# Patient Record
Sex: MALE | Race: WHITE | NOT HISPANIC OR LATINO | Employment: FULL TIME | URBAN - METROPOLITAN AREA
[De-identification: names, ages, dates, MRNs, and addresses within clinical notes are randomized per-mention and may not be internally consistent; named-entity substitution may affect disease eponyms.]

---

## 2020-08-25 ENCOUNTER — APPOINTMENT (OUTPATIENT)
Dept: PREADMISSION TESTING | Facility: HOSPITAL | Age: 60
End: 2020-08-25

## 2020-08-25 LAB
REF LAB TEST METHOD: NORMAL
SARS-COV-2 RNA RESP QL NAA+PROBE: NOT DETECTED

## 2020-08-25 PROCEDURE — C9803 HOPD COVID-19 SPEC COLLECT: HCPCS

## 2020-08-25 PROCEDURE — U0004 COV-19 TEST NON-CDC HGH THRU: HCPCS

## 2020-08-25 PROCEDURE — U0002 COVID-19 LAB TEST NON-CDC: HCPCS

## 2020-10-12 ENCOUNTER — OFFICE VISIT (OUTPATIENT)
Dept: NEUROLOGY | Facility: CLINIC | Age: 60
End: 2020-10-12

## 2020-10-12 VITALS
HEART RATE: 99 BPM | SYSTOLIC BLOOD PRESSURE: 128 MMHG | HEIGHT: 76 IN | TEMPERATURE: 97.3 F | BODY MASS INDEX: 28.69 KG/M2 | DIASTOLIC BLOOD PRESSURE: 86 MMHG | OXYGEN SATURATION: 98 % | WEIGHT: 235.6 LBS

## 2020-10-12 DIAGNOSIS — R25.1 TREMOR: Primary | ICD-10-CM

## 2020-10-12 PROCEDURE — 99204 OFFICE O/P NEW MOD 45 MIN: CPT | Performed by: PSYCHIATRY & NEUROLOGY

## 2020-10-12 RX ORDER — PRIMIDONE 50 MG/1
100 TABLET ORAL NIGHTLY
Qty: 60 TABLET | Refills: 2 | Status: CANCELLED | OUTPATIENT
Start: 2020-10-12 | End: 2021-10-12

## 2020-10-12 RX ORDER — ERGOCALCIFEROL (VITAMIN D2) 10 MCG
400 TABLET ORAL DAILY
COMMUNITY
End: 2022-12-22

## 2020-10-12 RX ORDER — UBIDECARENONE 75 MG
CAPSULE ORAL
COMMUNITY
End: 2022-12-22

## 2020-10-12 NOTE — PROGRESS NOTES
Subjective:    CC: Benjie Wheat is seen today in consultation at the request of Cruzito Muñoz MD for Tremors       HPI:  Patient is a 59-year-old male without any past medical history referred to the clinic for evaluation of tremors.  He reports that he started having tremors about 14 months ago in August 2019.  It primarily involves left hand and their worst when he is writing.  He is an  by profession and he has to write a lot throughout the day and it has progressively become worse.  He reports that in addition to writing, he has also noticed tremors while he is holding cup of coffee, glass of water or when he is using spoon or fork.  He denies any resting tremors.  Denies any problems with walking, no problems with chewing swallowing and denies any change in speech quality last 40 years.  There is no family history of tremors.    The following portions of the patient's history were reviewed today and updated as of 10/12/2020  : allergies, social history and problem list.  This document will be scanned to patient's chart.      Current Outpatient Medications:   •  vitamin B-12 (CYANOCOBALAMIN) 100 MCG tablet, cyanocobalamin (vit B-12) 1,000 mcg tablet  TK 1 T PO QD, Disp: , Rfl:   •  Vitamin D, Cholecalciferol, (CHOLECALCIFEROL) 10 MCG (400 UNIT) tablet, Take 400 Units by mouth Daily., Disp: , Rfl:    Past Medical History:   Diagnosis Date   • Fracture    • Kidney stones       Past Surgical History:   Procedure Laterality Date   • CHOLECYSTECTOMY        History reviewed. No pertinent family history.   Review of Systems   Constitutional: Negative.    HENT: Negative.    Eyes: Negative.    Respiratory: Negative.    Cardiovascular: Negative.    Gastrointestinal: Negative.    Endocrine: Negative.    Genitourinary: Negative.    Musculoskeletal: Negative.    Skin: Negative.    Allergic/Immunologic: Negative.    Neurological: Positive for tremors.   Hematological: Negative.    Psychiatric/Behavioral:  "Negative.        All other systems reviewed and are negative     Objective:    /86   Pulse 99   Temp 97.3 °F (36.3 °C)   Ht 193 cm (76\")   Wt 107 kg (235 lb 9.6 oz)   SpO2 98%   BMI 28.68 kg/m²     Neurology Exam:    General apperance: NAD.     Mental status: Alert, awake and oriented to time place and person.    Recent and Remote memory: Can recall 3/3 objects at 5 minutes. Can recall historical events.     Attention span and Concentration: Serial 7s: Normal.     Fund of knowledge:  Normal.     Language and Speech: No aphasia or dysarthria.    Naming , Repitition and Comprehension:  Can name objects, repeat a sentence and follow commands. Speech is clear and fluent with good repetition, comprehension, and naming.    Cranial Nerves:   CN II: Visual fields are full. Intact. Fundi - Normal, No papillederma, Pupils - MEETA  CN III, IV and VI: Extraocular movements are intact. Normal saccades.   CN V: Facial sensation is intact.   CN VII: Muscles of facial expression reveal no asymmetry. Intact.   CN VIII: Hearing is intact. Whispered voice intact.   CN IX and X: Palate elevates symmetrically. Intact  CN XI: Shoulder shrug is intact.   CN XII: Tongue is midline without evidence of atrophy or fasciculation.     Motor:  Right UE muscle strength 5/5. Normal tone.     Left UE muscle strength 5/5. Normal tone.      Right LE muscle strength5/5. Normal tone.     Left LE muscle strength 5/5. Normal tone.      Sensory: Normal light touch, vibration and pinprick sensation bilaterally.    DTRs: 2+ bilaterally in upper and lower extremities.    Babinski: Negative bilaterally.    Co-ordination: Normal finger-to-nose, heel to shin B/L.  Mild postural and action tremors noted on the left.  Significant tremors noted while writing.    Rhomberg: Negative.    Gait: Normal.    Cardiovascular: Regular rate and rhythm without murmur, gallop or rub.    Ophthalmoscopic exam: Normal fundi, no papilledema.    Assessment and Plan:  1. 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  He was noted to have significant tremor specifically while writing.  He did have mild intensity postural and action tremors on the left as well.  I am going to start him on primidone 50 mg at bedtime for 1 week then 100 mg after that for symptomatic relief.  Based on the response, further dose adjustment will be made.  I have advised him to call office with response in 7 to 10 days once he is 100 mg dose.  If no response with appropriate dose of primidone then may consider referring him to [facility] for evaluation of possible writer's cramp and Botox treatment for the same.  I will plan to see him back in 6 weeks for follow-up.     Return in about 6 weeks (around 11/23/2020).     Tanner Guadarrama MD

## 2020-10-13 RX ORDER — PRIMIDONE 50 MG/1
100 TABLET ORAL NIGHTLY
Qty: 60 TABLET | Refills: 2 | Status: SHIPPED | OUTPATIENT
Start: 2020-10-13 | End: 2021-10-13

## 2020-11-19 ENCOUNTER — OFFICE VISIT (OUTPATIENT)
Dept: NEUROLOGY | Facility: CLINIC | Age: 60
End: 2020-11-19

## 2020-11-19 VITALS
BODY MASS INDEX: 29.18 KG/M2 | DIASTOLIC BLOOD PRESSURE: 82 MMHG | OXYGEN SATURATION: 99 % | HEIGHT: 76 IN | TEMPERATURE: 97.5 F | HEART RATE: 91 BPM | WEIGHT: 239.6 LBS | SYSTOLIC BLOOD PRESSURE: 116 MMHG

## 2020-11-19 DIAGNOSIS — R25.1 TREMOR: Primary | ICD-10-CM

## 2020-11-19 DIAGNOSIS — F48.8: ICD-10-CM

## 2020-11-19 PROCEDURE — 99214 OFFICE O/P EST MOD 30 MIN: CPT | Performed by: PSYCHIATRY & NEUROLOGY

## 2020-11-19 RX ORDER — PROPRANOLOL HYDROCHLORIDE 20 MG/1
20 TABLET ORAL 2 TIMES DAILY
Qty: 60 TABLET | Refills: 1 | Status: SHIPPED | OUTPATIENT
Start: 2020-11-19 | End: 2022-05-13

## 2020-11-19 NOTE — PROGRESS NOTES
Subjective:    CC: Benjie Wheat is in clinic today for follow up for      HPI:  Initial visit: 10/12/2020: Patient is a 59-year-old male without any past medical history referred to the clinic for evaluation of tremors.  He reports that he started having tremors about 14 months ago in August 2019.  It primarily involves left hand and their worst when he is writing.  He is an  by profession and he has to write a lot throughout the day and it has progressively become worse.  He reports that in addition to writing, he has also noticed tremors while he is holding cup of coffee, glass of water or when he is using spoon or fork.  He denies any resting tremors.  Denies any problems with walking, no problems with chewing swallowing and denies any change in speech quality last 40 years.  There is no family history of tremors.    Follow-up: 11/19/2020: He is in clinic for regular follow-up.  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  So he stopped taking the medication.  While he was taking it for about 10 days, he did not notice much change in his tremors.  He continues to have significant tremor especially when he is writing but also when he is holding cup of coffee, glass of water, using spoon or fork.    The following portions of the patient's history were reviewed and updated as of 11/19/2020: allergies, social history and problem list.       Current Outpatient Medications:   •  primidone (MYSOLINE) 50 MG tablet, Take 2 tablets by mouth Every Night., Disp: 60 tablet, Rfl: 2  •  vitamin B-12 (CYANOCOBALAMIN) 100 MCG tablet, cyanocobalamin (vit B-12) 1,000 mcg tablet  TK 1 T PO QD, Disp: , Rfl:   •  Vitamin D, Cholecalciferol, (CHOLECALCIFEROL) 10 MCG (400 UNIT) tablet, Take 400 Units by mouth Daily., Disp: , Rfl:   "  Past Medical History:   Diagnosis Date   • Fracture    • Kidney stones       Past Surgical History:   Procedure Laterality Date   • CHOLECYSTECTOMY        History reviewed. No pertinent family history.     Review of Systems   Constitutional: Negative.    HENT: Negative.    Eyes: Negative.    Respiratory: Negative.    Cardiovascular: Negative.    Gastrointestinal: Negative.    Endocrine: Negative.    Genitourinary: Negative.    Musculoskeletal: Negative.    Skin: Negative.    Allergic/Immunologic: Negative.    Neurological: Positive for tremors.   Hematological: Negative.    Psychiatric/Behavioral: Negative.      Objective:    /82   Pulse 91   Temp 97.5 °F (36.4 °C)   Ht 193 cm (75.98\")   Wt 109 kg (239 lb 9.6 oz)   SpO2 99%   BMI 29.18 kg/m²     Neurology Exam:  General apperance: NAD.     Mental status: Alert, awake and oriented to time place and person.    Recent and Remote memory: Can recall 3/3 objects at 5 minutes. Can recall historical events.     Attention span and Concentration: Serial 7s: Normal.     Fund of knowledge:  Normal.     Language and Speech: No aphasia or dysarthria.    Naming , Repitition and Comprehension:  Can name objects, repeat a sentence and follow commands. Speech is clear and fluent with good repetition, comprehension, and naming.    CN II to XII: Intact.    Opthalmoscopic Exam: No papilledema.    Motor:  Right UE muscle strength 5/5. Normal tone.     Left UE muscle strength 5/5. Normal tone.      Right LE muscle strength5/5. Normal tone.     Left LE muscle strength 5/5. Normal tone.      Sensory: Normal light touch, vibration and pinprick sensation bilaterally.    DTRs: 2+ bilaterally.  Mild action and postural tremors noted bilaterally-worse on the left than on the right.  Significant tremors noted while writing.  He is unable to write legibly because of this tremors.  Very mild head tremors were noted as well.    Babinski: Negative bilaterally.    Co-ordination: Normal " finger-to-nose, heel to warren B/L.    Rhomberg: Negative.    Gait: Normal.    Cardiovascular: Regular rate and rhythm without murmur, gallop or rub.    Assessment and Plan:  1. Tremor  2. Writers' cramp  -I think he has both essential tremors and writer's cramp on the left.  Since he could not tolerate primidone, I am going to start him on propranolol 20 mg twice daily and see how he does.  Based on the response, further dose adjustment will be made in future.  Have advised him to call office in case if he develops dizziness or any side effects with propranolol use.  I will be referring him to Dr. Moffett- [facility] for evaluation of writer's cramp and possible Botox treatment for the same.  I will plan to see him back in 6 weeks for follow-up.       I spent 25 minutes face to face with the patient and spent more than 50% of this time  in management, instructions and education regarding above mentioned diagnosis and also on counseling and discussing about taking medication regularly, possible side effects with medication use, importance of good sleep hygiene, good hydration and regular exercise.    Return in about 6 weeks (around 12/31/2020).

## 2021-03-31 ENCOUNTER — TELEPHONE (OUTPATIENT)
Dept: NEUROLOGY | Facility: CLINIC | Age: 61
End: 2021-03-31

## 2021-12-10 ENCOUNTER — TELEPHONE (OUTPATIENT)
Dept: NEUROLOGY | Facility: CLINIC | Age: 61
End: 2021-12-10

## 2021-12-10 NOTE — TELEPHONE ENCOUNTER
Caller: LIYA    Relationship: SELF    Best call back number: [phone]    What form or medical record are you requesting: DIS[employer] INSURANCE POLICY APPLICATION    Who is requesting this form or medical record from you: DISABILITY    How would you like to receive the form or medical records (pick-up, mail, fax):   PATIENT WILL FAX TO OFFICE [phone]  THEN WILL NEED FAXED TO   [phone]    Timeframe paperwork needed: AS SOON AS POSSIBLE    Additional notes: PT STATES HE IS AN  AND HE STATES HIS DX IS HAVING A IMPACT ON HIS WORK SO HE WANTS TO MAKE SURE THAT THE PAPERWORK IS COMPLETELY CORRECTLY.

## 2021-12-10 NOTE — TELEPHONE ENCOUNTER
PT CALLED BACK IN TO UPDATE HIS CALL FROM 10 MINUTES AGO. HE STATES THAT HE IS AN ANTERNY FOR DISABLITY AND HOW [facility] WASN'T CALLING HIM HOWEVER THEY JUST CALLED HIM TODAY AND WILL TALK TO THE [facility] SINCE HE WILL BE ABLE TO TALK TO THAT DRSammie AND SEE IF HE IS OKAY WITH FILLING OUT THAT FORM AND IF HE IS NOT THEN HE WILL SEND IT BACK TO US TO BE FILLED OUT.

## 2022-05-06 ENCOUNTER — OFFICE VISIT (OUTPATIENT)
Dept: FAMILY MEDICINE CLINIC | Facility: CLINIC | Age: 62
End: 2022-05-06

## 2022-05-06 VITALS
HEART RATE: 92 BPM | HEIGHT: 76 IN | WEIGHT: 250.1 LBS | SYSTOLIC BLOOD PRESSURE: 132 MMHG | TEMPERATURE: 97.8 F | OXYGEN SATURATION: 99 % | RESPIRATION RATE: 12 BRPM | DIASTOLIC BLOOD PRESSURE: 84 MMHG | BODY MASS INDEX: 30.45 KG/M2

## 2022-05-06 DIAGNOSIS — N18.31 CHRONIC KIDNEY DISEASE, STAGE 3A: ICD-10-CM

## 2022-05-06 DIAGNOSIS — R53.83 FATIGUE, UNSPECIFIED TYPE: ICD-10-CM

## 2022-05-06 DIAGNOSIS — R73.9 HYPERGLYCEMIA: ICD-10-CM

## 2022-05-06 DIAGNOSIS — G25.2 DYSTONIC TREMOR: ICD-10-CM

## 2022-05-06 DIAGNOSIS — E53.8 VITAMIN B12 DEFICIENCY: ICD-10-CM

## 2022-05-06 DIAGNOSIS — R11.0 NAUSEA: Primary | ICD-10-CM

## 2022-05-06 DIAGNOSIS — Z80.0 FAMILY HISTORY OF COLON CANCER: ICD-10-CM

## 2022-05-06 DIAGNOSIS — E55.9 VITAMIN D DEFICIENCY: ICD-10-CM

## 2022-05-06 LAB
EXPIRATION DATE: NORMAL
FLUAV AG UPPER RESP QL IA.RAPID: NOT DETECTED
FLUBV AG UPPER RESP QL IA.RAPID: NOT DETECTED
INTERNAL CONTROL: NORMAL
Lab: NORMAL
SARS-COV-2 AG UPPER RESP QL IA.RAPID: NOT DETECTED

## 2022-05-06 PROCEDURE — 99214 OFFICE O/P EST MOD 30 MIN: CPT | Performed by: FAMILY MEDICINE

## 2022-05-06 PROCEDURE — 87428 SARSCOV & INF VIR A&B AG IA: CPT | Performed by: FAMILY MEDICINE

## 2022-05-06 PROCEDURE — 93000 ELECTROCARDIOGRAM COMPLETE: CPT | Performed by: FAMILY MEDICINE

## 2022-05-06 NOTE — ASSESSMENT & PLAN NOTE
Patient has very nonspecific symptoms.  He mainly complains of just fatigue and nausea.  Denies any chest pain shortness of breath fever vomiting diarrhea.  EKG shows a sinus rhythm with first-degree AV block but no significant change from previous EKG.  Discussed with him that the more nonspecific his symptoms and physical findings the more broad differential.  I am going to draw some blood work today I discussed at length that if he has any worsening symptoms or new symptoms such as chest pain shortness of breath he is to go to the emergency room and I going to see him in a week

## 2022-05-06 NOTE — PROGRESS NOTES
Patient Name: Benjie Wheat  : 1960   MRN: [id number]     Chief Complaint:    Chief Complaint   Patient presents with   • Nausea     Pt c/o stomach pains and nausea, x 2 weeks now pt state just don't feel good       History of Present Illness: Benjie Wheat is a 61 y.o. male who is here today for follow up for fatigue  HPI        Review of Systems:   Review of Systems   Constitutional: Positive for fatigue.   HENT: Negative.    Eyes: Negative.    Respiratory: Negative.    Cardiovascular: Negative.    Gastrointestinal: Positive for nausea.   Neurological: Negative.         Past Medical History:   Past Medical History:   Diagnosis Date   • Blood chemistry abnormality 2010   • Disorder of bilirubin excretion 2010   • Fracture    • Kidney stones 12/10/2010   • Mixed hyperlipidemia 2010       Past Surgical History: History reviewed. No pertinent surgical history.    Family History:   Family History   Family history unknown: Yes       Social History:   Social History     Socioeconomic History   • Marital status:    Tobacco Use   • Smoking status: Never Smoker   • Smokeless tobacco: Never Used   Substance and Sexual Activity   • Alcohol use: Never   • Drug use: Never       Medications:     Current Outpatient Medications:   •  propranolol (INDERAL) 20 MG tablet, Take 1 tablet by mouth 2 (Two) Times a Day., Disp: 60 tablet, Rfl: 1  •  vitamin B-12 (CYANOCOBALAMIN) 100 MCG tablet, cyanocobalamin (vit B-12) 1,000 mcg tablet  TK 1 T PO QD, Disp: , Rfl:   •  Vitamin D, Cholecalciferol, (CHOLECALCIFEROL) 10 MCG (400 UNIT) tablet, Take 400 Units by mouth Daily., Disp: , Rfl:   •  primidone (MYSOLINE) 50 MG tablet, Take 2 tablets by mouth Every Night., Disp: 60 tablet, Rfl: 2    Allergies:   Allergies   Allergen Reactions   • Pantoprazole Hives         Physical Exam:  Vital Signs:   Vitals:    22 1357   BP: 132/84   BP Location: Left arm   Patient Position: Sitting   Cuff  "Size: Adult   Pulse: 92   Resp: 12   Temp: 97.8 °F (36.6 °C)   TempSrc: Temporal   SpO2: 99%  Comment: r/a   Weight: 113 kg (250 lb 1.6 oz)   Height: 193 cm (76\")   PainSc: 0-No pain     Body mass index is 30.44 kg/m².     Physical Exam  Vitals and nursing note reviewed.   Constitutional:       Appearance: Normal appearance. He is normal weight.   HENT:      Head: Normocephalic and atraumatic.      Right Ear: Tympanic membrane, ear canal and external ear normal.      Left Ear: Tympanic membrane, ear canal and external ear normal.      Nose: Nose normal.      Mouth/Throat:      Mouth: Mucous membranes are dry.      Pharynx: Oropharynx is clear.   Eyes:      Extraocular Movements: Extraocular movements intact.      Conjunctiva/sclera: Conjunctivae normal.      Pupils: Pupils are equal, round, and reactive to light.   Cardiovascular:      Rate and Rhythm: Normal rate and regular rhythm.      Pulses: Normal pulses.      Heart sounds: Normal heart sounds.   Pulmonary:      Effort: Pulmonary effort is normal.      Breath sounds: Normal breath sounds.   Musculoskeletal:      Cervical back: Normal range of motion and neck supple.   Feet:      Comments:      Neurological:      Mental Status: He is alert.           ECG 12 Lead    Date/Time: 5/6/2022 3:16 PM  Performed by: Cruzito Muñoz MD  Authorized by: Cruzito Muñoz MD   Comparison: compared with previous ECG from 6/12/2020  Rhythm: sinus rhythm  Rate: normal  Conduction: 1st degree AV block  ST Segments: ST segments normal  T Waves: T waves normal  QRS axis: normal  Other: no other findings  Comments: No sig change from previous EKG              Assessment/Plan:   Diagnoses and all orders for this visit:    1. Nausea (Primary)  -     POCT SARS-CoV-2 Antigen ANA CRISTINA + Flu  -     Comprehensive Metabolic Panel; Future  -     Hemoglobin A1c; Future  -     Lipid Panel; Future  -     CBC & Differential; Future  -     Vitamin B12; Future  -     Vitamin D 25 Hydroxy; " Future  -     TSH Rfx On Abnormal To Free T4; Future  -     TSH Rfx On Abnormal To Free T4  -     Vitamin D 25 Hydroxy  -     Vitamin B12  -     CBC & Differential  -     Lipid Panel  -     Hemoglobin A1c  -     Comprehensive Metabolic Panel    2. Hyperglycemia  Assessment & Plan:  Blood work today    Orders:  -     Comprehensive Metabolic Panel; Future  -     Hemoglobin A1c; Future  -     Lipid Panel; Future  -     CBC & Differential; Future  -     Vitamin B12; Future  -     Vitamin D 25 Hydroxy; Future  -     TSH Rfx On Abnormal To Free T4; Future  -     TSH Rfx On Abnormal To Free T4  -     Vitamin D 25 Hydroxy  -     Vitamin B12  -     CBC & Differential  -     Lipid Panel  -     Hemoglobin A1c  -     Comprehensive Metabolic Panel    3. Vitamin B12 deficiency  Assessment & Plan:  Blood work    Orders:  -     Comprehensive Metabolic Panel; Future  -     Hemoglobin A1c; Future  -     Lipid Panel; Future  -     CBC & Differential; Future  -     Vitamin B12; Future  -     Vitamin D 25 Hydroxy; Future  -     TSH Rfx On Abnormal To Free T4; Future  -     TSH Rfx On Abnormal To Free T4  -     Vitamin D 25 Hydroxy  -     Vitamin B12  -     CBC & Differential  -     Lipid Panel  -     Hemoglobin A1c  -     Comprehensive Metabolic Panel    4. Vitamin D deficiency  Assessment & Plan:  Blood work    Orders:  -     Comprehensive Metabolic Panel; Future  -     Hemoglobin A1c; Future  -     Lipid Panel; Future  -     CBC & Differential; Future  -     Vitamin B12; Future  -     Vitamin D 25 Hydroxy; Future  -     TSH Rfx On Abnormal To Free T4; Future  -     TSH Rfx On Abnormal To Free T4  -     Vitamin D 25 Hydroxy  -     Vitamin B12  -     CBC & Differential  -     Lipid Panel  -     Hemoglobin A1c  -     Comprehensive Metabolic Panel    5. Fatigue, unspecified type  Assessment & Plan:  Patient has very nonspecific symptoms.  He mainly complains of just fatigue and nausea.  Denies any chest pain shortness of breath fever  vomiting diarrhea.  EKG shows a sinus rhythm with first-degree AV block but no significant change from previous EKG.  Discussed with him that the more nonspecific his symptoms and physical findings the more broad differential.  I am going to draw some blood work today I discussed at length that if he has any worsening symptoms or new symptoms such as chest pain shortness of breath he is to go to the emergency room and I going to see him in a week    Orders:  -     Comprehensive Metabolic Panel; Future  -     Hemoglobin A1c; Future  -     Lipid Panel; Future  -     CBC & Differential; Future  -     Vitamin B12; Future  -     Vitamin D 25 Hydroxy; Future  -     TSH Rfx On Abnormal To Free T4; Future  -     TSH Rfx On Abnormal To Free T4  -     Vitamin D 25 Hydroxy  -     Vitamin B12  -     CBC & Differential  -     Lipid Panel  -     Hemoglobin A1c  -     Comprehensive Metabolic Panel    6. Family history of colon cancer  -     Comprehensive Metabolic Panel; Future  -     Hemoglobin A1c; Future  -     Lipid Panel; Future  -     CBC & Differential; Future  -     Vitamin B12; Future  -     Vitamin D 25 Hydroxy; Future  -     TSH Rfx On Abnormal To Free T4; Future  -     TSH Rfx On Abnormal To Free T4  -     Vitamin D 25 Hydroxy  -     Vitamin B12  -     CBC & Differential  -     Lipid Panel  -     Hemoglobin A1c  -     Comprehensive Metabolic Panel    7. Dystonic tremor  -     Comprehensive Metabolic Panel; Future  -     Hemoglobin A1c; Future  -     Lipid Panel; Future  -     CBC & Differential; Future  -     Vitamin B12; Future  -     Vitamin D 25 Hydroxy; Future  -     TSH Rfx On Abnormal To Free T4; Future  -     TSH Rfx On Abnormal To Free T4  -     Vitamin D 25 Hydroxy  -     Vitamin B12  -     CBC & Differential  -     Lipid Panel  -     Hemoglobin A1c  -     Comprehensive Metabolic Panel    8. Chronic kidney disease, stage 3a (HCC)  -     Comprehensive Metabolic Panel; Future  -     Hemoglobin A1c; Future  -      Lipid Panel; Future  -     CBC & Differential; Future  -     Vitamin B12; Future  -     Vitamin D 25 Hydroxy; Future  -     TSH Rfx On Abnormal To Free T4; Future  -     TSH Rfx On Abnormal To Free T4  -     Vitamin D 25 Hydroxy  -     Vitamin B12  -     CBC & Differential  -     Lipid Panel  -     Hemoglobin A1c  -     Comprehensive Metabolic Panel    Other orders  -     ECG 12 Lead           Follow Up:   Return in about 1 week (around 5/13/2022).    Cruzito Muñoz MD  [facility] [place] [facility]

## 2022-05-07 LAB
ALBUMIN SERPL-MCNC: 4.4 G/DL (ref 3.8–4.8)
ALBUMIN/GLOB SERPL: 1.6 {RATIO} (ref 1.2–2.2)
ALP SERPL-CCNC: 52 IU/L (ref 44–121)
ALT SERPL-CCNC: 19 IU/L (ref 0–44)
AST SERPL-CCNC: 16 IU/L (ref 0–40)
BILIRUB SERPL-MCNC: 1.1 MG/DL (ref 0–1.2)
BUN SERPL-MCNC: 15 MG/DL (ref 8–27)
BUN/CREAT SERPL: 10 (ref 10–24)
CALCIUM SERPL-MCNC: 9.4 MG/DL (ref 8.6–10.2)
CHLORIDE SERPL-SCNC: 105 MMOL/L (ref 96–106)
CHOLEST SERPL-MCNC: 204 MG/DL (ref 100–199)
CO2 SERPL-SCNC: 21 MMOL/L (ref 20–29)
CREAT SERPL-MCNC: 1.44 MG/DL (ref 0.76–1.27)
EGFRCR SERPLBLD CKD-EPI 2021: 55 ML/MIN/1.73
GLOBULIN SER CALC-MCNC: 2.8 G/DL (ref 1.5–4.5)
GLUCOSE SERPL-MCNC: 89 MG/DL (ref 65–99)
HDLC SERPL-MCNC: 61 MG/DL
LDLC SERPL CALC-MCNC: 125 MG/DL (ref 0–99)
POTASSIUM SERPL-SCNC: 4.1 MMOL/L (ref 3.5–5.2)
PROT SERPL-MCNC: 7.2 G/DL (ref 6–8.5)
SODIUM SERPL-SCNC: 141 MMOL/L (ref 134–144)
SPECIMEN STATUS: NORMAL
TRIGL SERPL-MCNC: 100 MG/DL (ref 0–149)
TSH SERPL DL<=0.005 MIU/L-ACNC: 1.89 UIU/ML (ref 0.45–4.5)
VIT B12 SERPL-MCNC: 1479 PG/ML (ref 232–1245)
VLDLC SERPL CALC-MCNC: 18 MG/DL (ref 5–40)

## 2022-05-08 LAB — HBA1C MFR BLD: 5.8 % (ref 4.8–5.6)

## 2022-05-13 ENCOUNTER — OFFICE VISIT (OUTPATIENT)
Dept: FAMILY MEDICINE CLINIC | Facility: CLINIC | Age: 62
End: 2022-05-13

## 2022-05-13 DIAGNOSIS — Z12.5 SCREENING FOR PROSTATE CANCER: ICD-10-CM

## 2022-05-13 DIAGNOSIS — N18.31 CHRONIC KIDNEY DISEASE, STAGE 3A: ICD-10-CM

## 2022-05-13 DIAGNOSIS — R11.0 NAUSEA: ICD-10-CM

## 2022-05-13 DIAGNOSIS — R53.83 FATIGUE, UNSPECIFIED TYPE: Primary | ICD-10-CM

## 2022-05-13 PROBLEM — G25.2 DYSTONIC TREMOR: Status: ACTIVE | Noted: 2021-04-14

## 2022-05-13 PROCEDURE — 99214 OFFICE O/P EST MOD 30 MIN: CPT | Performed by: FAMILY MEDICINE

## 2022-05-13 RX ORDER — CLINDAMYCIN HYDROCHLORIDE 300 MG/1
CAPSULE ORAL
COMMUNITY
End: 2022-05-13

## 2022-05-13 RX ORDER — AMOXICILLIN 500 MG/1
CAPSULE ORAL
COMMUNITY
End: 2022-05-13

## 2022-05-13 RX ORDER — PYRAZINAMIDE 500 MG/1
TABLET ORAL
COMMUNITY
End: 2022-05-13

## 2022-05-13 NOTE — ASSESSMENT & PLAN NOTE
GFR 55.Patient is instructed to not take any NSAIDs.  Medicines as directed.  Stay well-hydrated.

## 2022-05-13 NOTE — ASSESSMENT & PLAN NOTE
Patient feels a little better today.  Patient has a history of kidney stones and states this may be what he usually feels when he gets a kidney stone.  He has an appointment for a CAT scan as ordered by his urologist.    I told him to make an appointment for a physical in 3 months.  If the urologist does not find a kidney stone and he gets better recheck in 3 months.  If urologist finds a kidney stone let him deal with that and see how he does.  If the urologist does not find a kidney stone and he worsens return to clinic.    Blood work was okay

## 2022-05-13 NOTE — PROGRESS NOTES
Patient Name: Benjie Wheat  : 1960   MRN: [id number]     Chief Complaint:    Chief Complaint   Patient presents with   • Follow-up     Blood Work       History of Present Illness: Benjie Wheat is a 61 y.o. male who is here today for follow up on fatigue and nausea.  HPI        Review of Systems:   Review of Systems   Constitutional: Positive for fatigue.   HENT: Negative.    Eyes: Negative.    Respiratory: Negative.    Cardiovascular: Negative.    Gastrointestinal: Positive for nausea.   Neurological: Negative.         Past Medical History:   Past Medical History:   Diagnosis Date   • Blood chemistry abnormality 2010   • Disorder of bilirubin excretion 2010   • Fracture    • Kidney stones 12/10/2010   • Mixed hyperlipidemia 2010       Past Surgical History: No past surgical history on file.    Family History:   Family History   Family history unknown: Yes       Social History:   Social History     Socioeconomic History   • Marital status:    Tobacco Use   • Smoking status: Never Smoker   • Smokeless tobacco: Never Used   Substance and Sexual Activity   • Alcohol use: Never   • Drug use: Never       Medications:     Current Outpatient Medications:   •  vitamin B-12 (CYANOCOBALAMIN) 100 MCG tablet, cyanocobalamin (vit B-12) 1,000 mcg tablet  TK 1 T PO QD, Disp: , Rfl:   •  Vitamin D, Cholecalciferol, (CHOLECALCIFEROL) 10 MCG (400 UNIT) tablet, Take 400 Units by mouth Daily., Disp: , Rfl:   •  acetaminophen-codeine (TYLENOL with CODEINE #3) 300-30 MG per tablet, acetaminophen 300 mg-codeine 30 mg tablet  TAKE 1 TO 2 TABLETS BY MOUTH EVERY 4 TO 6 HOURS AS NEEDED FOR PAIN, Disp: , Rfl:   •  amoxicillin (AMOXIL) 500 MG capsule, amoxicillin 500 mg capsule  TAKE ONE CAPSULE BY MOUTH FOUR TIMES DAILY UNTIL ALL TAKEN, Disp: , Rfl:   •  clindamycin (CLEOCIN) 300 MG capsule, clindamycin HCl 300 mg capsule  TAKE ONE CAPSULE BY MOUTH THREE TIMES DAILY UNTIL ALL TAKEN, Disp: ,  Rfl:   •  primidone (MYSOLINE) 50 MG tablet, Take 2 tablets by mouth Every Night., Disp: 60 tablet, Rfl: 2  •  propranolol (INDERAL) 20 MG tablet, Take 1 tablet by mouth 2 (Two) Times a Day., Disp: 60 tablet, Rfl: 1    Allergies:   No Active Allergies      Physical Exam:  Vital Signs: There were no vitals filed for this visit.  There is no height or weight on file to calculate BMI.     Physical Exam  Vitals and nursing note reviewed.   Constitutional:       Appearance: Normal appearance. He is normal weight.   HENT:      Head: Normocephalic and atraumatic.      Right Ear: Tympanic membrane, ear canal and external ear normal.      Left Ear: Tympanic membrane, ear canal and external ear normal.      Nose: Nose normal.      Mouth/Throat:      Mouth: Mucous membranes are dry.      Pharynx: Oropharynx is clear.   Eyes:      Extraocular Movements: Extraocular movements intact.      Conjunctiva/sclera: Conjunctivae normal.      Pupils: Pupils are equal, round, and reactive to light.   Cardiovascular:      Rate and Rhythm: Normal rate and regular rhythm.      Pulses: Normal pulses.      Heart sounds: Normal heart sounds.   Pulmonary:      Effort: Pulmonary effort is normal.      Breath sounds: Normal breath sounds.   Abdominal:      General: Abdomen is flat. Bowel sounds are normal.      Palpations: Abdomen is soft. There is no mass.      Tenderness: There is no abdominal tenderness. There is no right CVA tenderness, left CVA tenderness, guarding or rebound.   Musculoskeletal:      Cervical back: Normal range of motion and neck supple.   Feet:      Comments:      Neurological:      Mental Status: He is alert.         Procedures      Assessment/Plan:   Diagnoses and all orders for this visit:    1. Fatigue, unspecified type (Primary)  Assessment & Plan:  Patient feels a little better today.  Patient has a history of kidney stones and states this may be what he usually feels when he gets a kidney stone.  He has an appointment  for a CAT scan as ordered by his urologist.    I told him to make an appointment for a physical in 3 months.  If the urologist does not find a kidney stone and he gets better recheck in 3 months.  If urologist finds a kidney stone let him deal with that and see how he does.  If the urologist does not find a kidney stone and he worsens return to clinic.    Blood work was okay    Orders:  -     CBC & Differential; Future  -     Lipid Panel; Future  -     Comprehensive Metabolic Panel; Future  -     TSH Rfx On Abnormal To Free T4; Future  -     PSA Screen; Future  -     Hemoglobin A1c; Future  -     Vitamin B12; Future  -     Vitamin D 25 Hydroxy; Future    2. Nausea  Assessment & Plan:  Patient is a little bit better.  See above note    Orders:  -     CBC & Differential; Future  -     Lipid Panel; Future  -     Comprehensive Metabolic Panel; Future  -     TSH Rfx On Abnormal To Free T4; Future  -     PSA Screen; Future  -     Hemoglobin A1c; Future  -     Vitamin B12; Future  -     Vitamin D 25 Hydroxy; Future    3. Chronic kidney disease, stage 3a (HCC)  Assessment & Plan:  GFR 55.Patient is instructed to not take any NSAIDs.  Medicines as directed.  Stay well-hydrated.      Orders:  -     CBC & Differential; Future  -     Lipid Panel; Future  -     Comprehensive Metabolic Panel; Future  -     TSH Rfx On Abnormal To Free T4; Future  -     PSA Screen; Future  -     Hemoglobin A1c; Future  -     Vitamin B12; Future  -     Vitamin D 25 Hydroxy; Future    4. Screening for prostate cancer  -     PSA Screen; Future           Follow Up:   No follow-ups on file.    Cruzito Muñoz MD  [facility]

## 2022-07-07 ENCOUNTER — OFFICE VISIT (OUTPATIENT)
Dept: FAMILY MEDICINE CLINIC | Facility: CLINIC | Age: 62
End: 2022-07-07

## 2022-07-07 VITALS
TEMPERATURE: 97.4 F | OXYGEN SATURATION: 98 % | HEART RATE: 94 BPM | DIASTOLIC BLOOD PRESSURE: 80 MMHG | WEIGHT: 240 LBS | BODY MASS INDEX: 29.22 KG/M2 | RESPIRATION RATE: 12 BRPM | HEIGHT: 76 IN | SYSTOLIC BLOOD PRESSURE: 120 MMHG

## 2022-07-07 DIAGNOSIS — G25.2 DYSTONIC TREMOR: ICD-10-CM

## 2022-07-07 DIAGNOSIS — Z12.5 SCREENING FOR PROSTATE CANCER: ICD-10-CM

## 2022-07-07 DIAGNOSIS — N50.812 PAIN IN LEFT TESTICLE: Primary | ICD-10-CM

## 2022-07-07 DIAGNOSIS — E78.2 HYPERLIPIDEMIA, MIXED: ICD-10-CM

## 2022-07-07 DIAGNOSIS — R53.83 FATIGUE, UNSPECIFIED TYPE: ICD-10-CM

## 2022-07-07 DIAGNOSIS — R11.0 NAUSEA: ICD-10-CM

## 2022-07-07 DIAGNOSIS — F43.23 ADJUSTMENT DISORDER WITH MIXED ANXIETY AND DEPRESSED MOOD: ICD-10-CM

## 2022-07-07 DIAGNOSIS — N18.31 CHRONIC KIDNEY DISEASE, STAGE 3A: ICD-10-CM

## 2022-07-07 DIAGNOSIS — R73.9 HYPERGLYCEMIA: ICD-10-CM

## 2022-07-07 PROCEDURE — 99214 OFFICE O/P EST MOD 30 MIN: CPT | Performed by: FAMILY MEDICINE

## 2022-07-07 PROCEDURE — 93000 ELECTROCARDIOGRAM COMPLETE: CPT | Performed by: FAMILY MEDICINE

## 2022-07-07 PROCEDURE — 90471 IMMUNIZATION ADMIN: CPT | Performed by: FAMILY MEDICINE

## 2022-07-07 PROCEDURE — 90715 TDAP VACCINE 7 YRS/> IM: CPT | Performed by: FAMILY MEDICINE

## 2022-07-07 RX ORDER — ESCITALOPRAM OXALATE 10 MG/1
10 TABLET ORAL DAILY
Qty: 90 TABLET | Refills: 1 | Status: SHIPPED | OUTPATIENT
Start: 2022-07-07

## 2022-07-07 NOTE — PROGRESS NOTES
Patient Name: Benjie Wheat  : 1960   MRN: [id number]     Chief Complaint:    Chief Complaint   Patient presents with   • Groin Pain     Pt states this started this past weekend       History of Present Illness: Benjie Wheat is a 61 y.o. male who is here today for follow up.HPI        Review of Systems:   Review of Systems   Constitutional: Negative.    HENT: Negative.    Eyes: Negative.    Respiratory: Negative.    Cardiovascular: Negative.    Gastrointestinal: Negative.    Neurological: Negative.         Past Medical History:   Past Medical History:   Diagnosis Date   • Blood chemistry abnormality 2010   • Disorder of bilirubin excretion 2010   • Fracture    • Kidney stones 12/10/2010   • Mixed hyperlipidemia 2010       Past Surgical History: History reviewed. No pertinent surgical history.    Family History:   Family History   Family history unknown: Yes       Social History:   Social History     Socioeconomic History   • Marital status:    Tobacco Use   • Smoking status: Never Smoker   • Smokeless tobacco: Never Used   Substance and Sexual Activity   • Alcohol use: Never   • Drug use: Never       Medications:     Current Outpatient Medications:   •  carbidopa-levodopa (SINEMET)  MG per tablet, TAKE 1 AND 1/2 TABLETS BY MOUTH THREE TIMES DAILY, Disp: , Rfl:   •  vitamin B-12 (CYANOCOBALAMIN) 100 MCG tablet, cyanocobalamin (vit B-12) 1,000 mcg tablet  TK 1 T PO QD, Disp: , Rfl:   •  Vitamin D, Cholecalciferol, (CHOLECALCIFEROL) 10 MCG (400 UNIT) tablet, Take 400 Units by mouth Daily., Disp: , Rfl:   •  escitalopram (Lexapro) 10 MG tablet, Take 1 tablet by mouth Daily., Disp: 90 tablet, Rfl: 1  •  primidone (MYSOLINE) 50 MG tablet, Take 2 tablets by mouth Every Night., Disp: 60 tablet, Rfl: 2    Allergies:   No Known Allergies      Physical Exam:  Vital Signs:   Vitals:    22 1041   BP: 120/80   BP Location: Left arm   Patient Position: Sitting   Cuff  "Size: Adult   Pulse: 94   Resp: 12   Temp: 97.4 °F (36.3 °C)   TempSrc: Temporal   SpO2: 98%   Weight: 109 kg (240 lb)   Height: 193 cm (76\")   PainSc: 0-No pain     Body mass index is 29.21 kg/m².     Physical Exam  Vitals and nursing note reviewed.   Constitutional:       Appearance: Normal appearance. He is normal weight.   HENT:      Head: Normocephalic and atraumatic.      Right Ear: Tympanic membrane, ear canal and external ear normal.      Left Ear: Tympanic membrane, ear canal and external ear normal.      Nose: Nose normal.      Mouth/Throat:      Mouth: Mucous membranes are dry.      Pharynx: Oropharynx is clear.   Eyes:      Extraocular Movements: Extraocular movements intact.      Conjunctiva/sclera: Conjunctivae normal.      Pupils: Pupils are equal, round, and reactive to light.   Cardiovascular:      Rate and Rhythm: Normal rate and regular rhythm.      Pulses: Normal pulses.      Heart sounds: Normal heart sounds.   Pulmonary:      Effort: Pulmonary effort is normal.      Breath sounds: Normal breath sounds.   Musculoskeletal:      Cervical back: Normal range of motion and neck supple.   Feet:      Comments:      Neurological:      Mental Status: He is alert.           ECG 12 Lead    Date/Time: 7/7/2022 11:26 AM  Performed by: Cruzito Muñoz MD  Authorized by: Cruzito Muñoz MD   Comparison: compared with previous ECG from 5/6/2022  Comparison to previous ECG: No sig change  Rhythm: sinus rhythm  Rate: normal  Conduction: conduction normal  ST Segments: ST segments normal  T Waves: T waves normal  Other: no other findings    Clinical impression: normal ECG  Comments: First degree AV block              Assessment/Plan:   Diagnoses and all orders for this visit:    1. Pain in left testicle (Primary)  Assessment & Plan:  Patient has had persistent pain in his left testicle and penis.  Exam showed a little bit of swelling.  I am going to  get a testicular ultrasound.    Orders:  -     CBC & " Differential; Future  -     Comprehensive Metabolic Panel; Future  -     Lipase; Future  -     C-reactive Protein; Future  -     US Scrotum & Testicles; Future  -     C-reactive Protein  -     Lipase  -     Comprehensive Metabolic Panel  -     CBC & Differential    2. Hyperglycemia  Assessment & Plan:  Last A1c was 5.8.  We will follow.    Orders:  -     CBC & Differential; Future  -     Comprehensive Metabolic Panel; Future  -     Lipase; Future  -     C-reactive Protein; Future  -     C-reactive Protein  -     Lipase  -     Comprehensive Metabolic Panel  -     CBC & Differential    3. Chronic kidney disease, stage 3a (HCC)  Assessment & Plan:  Last GFR 55.  Patient is instructed to not take any NSAIDs.  Medicines as directed.  Stay well-hydrated.      Orders:  -     CBC & Differential; Future  -     Comprehensive Metabolic Panel; Future  -     Lipase; Future  -     C-reactive Protein; Future  -     C-reactive Protein  -     Lipase  -     Comprehensive Metabolic Panel  -     CBC & Differential  -     Vitamin D 25 Hydroxy  -     Vitamin B12  -     Hemoglobin A1c  -     PSA Screen  -     TSH Rfx On Abnormal To Free T4  -     Cancel: Comprehensive Metabolic Panel  -     Lipid Panel  -     Cancel: CBC & Differential    4. Hyperlipidemia, mixed  Assessment & Plan:  HDL 61.  .  We will follow.    Orders:  -     CBC & Differential; Future  -     Comprehensive Metabolic Panel; Future  -     Lipase; Future  -     C-reactive Protein; Future  -     C-reactive Protein  -     Lipase  -     Comprehensive Metabolic Panel  -     CBC & Differential    5. Nausea  Assessment & Plan:  Patient has had nausea and dyspepsia for the last 3 months has recently had a CAT scan of his abdomen and pelvis.  We will recheck some blood work today.  His last colonoscopy was around 2020.  Does have a family history of colon cancer.    Orders:  -     CBC & Differential; Future  -     Comprehensive Metabolic Panel; Future  -     Lipase;  Future  -     C-reactive Protein; Future  -     ECG 12 Lead  -     C-reactive Protein  -     Lipase  -     Comprehensive Metabolic Panel  -     CBC & Differential  -     Vitamin D 25 Hydroxy  -     Vitamin B12  -     Hemoglobin A1c  -     PSA Screen  -     TSH Rfx On Abnormal To Free T4  -     Cancel: Comprehensive Metabolic Panel  -     Lipid Panel  -     Cancel: CBC & Differential    6. Dystonic tremor  Assessment & Plan:  Patient has seen 2 neurologist.  The last 1 was at .  He evidently has a dystonic tremor.  And is showing some parkinsonian traits.  Has not been diagnosed with Parkinson's.    Orders:  -     CBC & Differential; Future  -     Comprehensive Metabolic Panel; Future  -     Lipase; Future  -     C-reactive Protein; Future  -     C-reactive Protein  -     Lipase  -     Comprehensive Metabolic Panel  -     CBC & Differential    7. Adjustment disorder with mixed anxiety and depressed mood  Assessment & Plan:  Patient has just been told that he might have Parkinson's.  Patient's brother was just diagnosed with metastatic colon cancer.  Patient's mother has had a stroke and he in 1 or 2 other siblings with only one taking care of her.  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  He is under lot of stress.  I am going to start him on some Lexapro 10 mg once a day he is going to recheck in 1 month to see how he is doing.    I am going to get a CBC, CRP, lipase, and CMP.  Return to clinic in 1 week.  He has some various complaints that are nonspecific in nature I Vashti get this Blood work in order to cover all my bases to ensure that there is nothing else metabolic going on.    Orders:  -     CBC & Differential; Future  -     Comprehensive Metabolic Panel; Future  -     Lipase; Future  -     C-reactive Protein; Future  -     C-reactive Protein  -     Lipase  -     Comprehensive Metabolic Panel  -     CBC & Differential    8.  Fatigue, unspecified type  -     Vitamin D 25 Hydroxy  -     Vitamin B12  -     Hemoglobin A1c  -     PSA Screen  -     TSH Rfx On Abnormal To Free T4  -     Cancel: Comprehensive Metabolic Panel  -     Lipid Panel  -     Cancel: CBC & Differential    9. Screening for prostate cancer  -     PSA Screen    Other orders  -     Tdap Vaccine Greater Than or Equal To 8yo IM  -     escitalopram (Lexapro) 10 MG tablet; Take 1 tablet by mouth Daily.  Dispense: 90 tablet; Refill: 1           Follow Up:   Return in about 11 days (around 7/18/2022).    Cruzito Muñoz MD  [facility]

## 2022-07-07 NOTE — ASSESSMENT & PLAN NOTE
Admission assessment completed. See flow sheet. Questions are encouraged and answered with patient, instructed patient to call out with any needs. Patient denies needs and pain at this time. RN at bedside every hour for vital signs checks. Patient has had nausea and dyspepsia for the last 3 months has recently had a CAT scan of his abdomen and pelvis.  We will recheck some blood work today.  His last colonoscopy was around 2020.  Does have a family history of colon cancer.

## 2022-07-07 NOTE — ASSESSMENT & PLAN NOTE
Patient has had persistent pain in his left testicle and penis.  Exam showed a little bit of swelling.  I am going to  get a testicular ultrasound.

## 2022-07-07 NOTE — ASSESSMENT & PLAN NOTE
Last GFR 55.  Patient is instructed to not take any NSAIDs.  Medicines as directed.  Stay well-hydrated.

## 2022-07-07 NOTE — ASSESSMENT & PLAN NOTE
Patient has just been told that he might have Parkinson's.  Patient's brother was just diagnosed with metastatic colon cancer.  Patient's mother has had a stroke and he in 1 or 2 other siblings with only one taking care of her.  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  He is under lot of stress.  I am going to start him on some Lexapro 10 mg once a day he is going to recheck in 1 month to see how he is doing.    I am going to get a CBC, CRP, lipase, and CMP.  Return to clinic in 1 week.  He has some various complaints that are nonspecific in nature I Vashti get this Blood work in order to cover all my bases to ensure that there is nothing else metabolic going on.

## 2022-07-07 NOTE — ASSESSMENT & PLAN NOTE
Patient has seen 2 neurologist.  The last 1 was at .  He evidently has a dystonic tremor.  And is showing some parkinsonian traits.  Has not been diagnosed with Parkinson's.

## 2022-07-08 LAB
ALBUMIN SERPL-MCNC: 4.4 G/DL (ref 3.8–4.8)
ALBUMIN/GLOB SERPL: 1.6 {RATIO} (ref 1.2–2.2)
ALP SERPL-CCNC: 60 IU/L (ref 44–121)
ALT SERPL-CCNC: 16 IU/L (ref 0–44)
AST SERPL-CCNC: 19 IU/L (ref 0–40)
BASOPHILS # BLD AUTO: 0 X10E3/UL (ref 0–0.2)
BASOPHILS NFR BLD AUTO: 1 %
BILIRUB SERPL-MCNC: 1.1 MG/DL (ref 0–1.2)
BUN SERPL-MCNC: 14 MG/DL (ref 8–27)
BUN/CREAT SERPL: 11 (ref 10–24)
CALCIUM SERPL-MCNC: 9.4 MG/DL (ref 8.6–10.2)
CHLORIDE SERPL-SCNC: 107 MMOL/L (ref 96–106)
CHOLEST SERPL-MCNC: 207 MG/DL (ref 100–199)
CO2 SERPL-SCNC: 21 MMOL/L (ref 20–29)
CREAT SERPL-MCNC: 1.33 MG/DL (ref 0.76–1.27)
EGFRCR SERPLBLD CKD-EPI 2021: 61 ML/MIN/1.73
EOSINOPHIL # BLD AUTO: 0.1 X10E3/UL (ref 0–0.4)
EOSINOPHIL NFR BLD AUTO: 1 %
ERYTHROCYTE [DISTWIDTH] IN BLOOD BY AUTOMATED COUNT: 13 % (ref 11.6–15.4)
GLOBULIN SER CALC-MCNC: 2.8 G/DL (ref 1.5–4.5)
GLUCOSE SERPL-MCNC: 96 MG/DL (ref 65–99)
HBA1C MFR BLD: 5.4 % (ref 4.8–5.6)
HCT VFR BLD AUTO: 48.3 % (ref 37.5–51)
HDLC SERPL-MCNC: 57 MG/DL
HGB BLD-MCNC: 17 G/DL (ref 13–17.7)
IMM GRANULOCYTES # BLD AUTO: 0 X10E3/UL (ref 0–0.1)
IMM GRANULOCYTES NFR BLD AUTO: 0 %
LDLC SERPL CALC-MCNC: 130 MG/DL (ref 0–99)
LIPASE SERPL-CCNC: 42 U/L (ref 13–78)
LYMPHOCYTES # BLD AUTO: 1.5 X10E3/UL (ref 0.7–3.1)
LYMPHOCYTES NFR BLD AUTO: 27 %
MCH RBC QN AUTO: 30.7 PG (ref 26.6–33)
MCHC RBC AUTO-ENTMCNC: 35.2 G/DL (ref 31.5–35.7)
MCV RBC AUTO: 87 FL (ref 79–97)
MONOCYTES # BLD AUTO: 0.4 X10E3/UL (ref 0.1–0.9)
MONOCYTES NFR BLD AUTO: 8 %
NEUTROPHILS # BLD AUTO: 3.5 X10E3/UL (ref 1.4–7)
NEUTROPHILS NFR BLD AUTO: 63 %
PLATELET # BLD AUTO: 390 X10E3/UL (ref 150–450)
POTASSIUM SERPL-SCNC: 4 MMOL/L (ref 3.5–5.2)
PROT SERPL-MCNC: 7.2 G/DL (ref 6–8.5)
PSA SERPL-MCNC: 0.9 NG/ML (ref 0–4)
RBC # BLD AUTO: 5.54 X10E6/UL (ref 4.14–5.8)
SODIUM SERPL-SCNC: 143 MMOL/L (ref 134–144)
TRIGL SERPL-MCNC: 110 MG/DL (ref 0–149)
TSH SERPL DL<=0.005 MIU/L-ACNC: 2.46 UIU/ML (ref 0.45–4.5)
VIT B12 SERPL-MCNC: 684 PG/ML (ref 232–1245)
VLDLC SERPL CALC-MCNC: 20 MG/DL (ref 5–40)
WBC # BLD AUTO: 5.5 X10E3/UL (ref 3.4–10.8)

## 2022-07-09 LAB — 25(OH)D3+25(OH)D2 SERPL-MCNC: 44 NG/ML (ref 30–100)

## 2022-07-19 ENCOUNTER — OFFICE VISIT (OUTPATIENT)
Dept: FAMILY MEDICINE CLINIC | Facility: CLINIC | Age: 62
End: 2022-07-19

## 2022-07-19 VITALS
SYSTOLIC BLOOD PRESSURE: 118 MMHG | DIASTOLIC BLOOD PRESSURE: 74 MMHG | OXYGEN SATURATION: 97 % | HEIGHT: 76 IN | RESPIRATION RATE: 12 BRPM | HEART RATE: 75 BPM | BODY MASS INDEX: 29.22 KG/M2 | WEIGHT: 240 LBS | TEMPERATURE: 97.8 F

## 2022-07-19 DIAGNOSIS — F43.23 ADJUSTMENT DISORDER WITH MIXED ANXIETY AND DEPRESSED MOOD: ICD-10-CM

## 2022-07-19 DIAGNOSIS — R11.0 NAUSEA: Primary | ICD-10-CM

## 2022-07-19 DIAGNOSIS — G25.2 DYSTONIC TREMOR: ICD-10-CM

## 2022-07-19 DIAGNOSIS — R73.9 HYPERGLYCEMIA: ICD-10-CM

## 2022-07-19 DIAGNOSIS — M79.641 BILATERAL HAND PAIN: ICD-10-CM

## 2022-07-19 DIAGNOSIS — Z13.9 SCREENING DUE: ICD-10-CM

## 2022-07-19 DIAGNOSIS — M79.642 BILATERAL HAND PAIN: ICD-10-CM

## 2022-07-19 PROCEDURE — 99214 OFFICE O/P EST MOD 30 MIN: CPT | Performed by: FAMILY MEDICINE

## 2022-07-19 NOTE — ASSESSMENT & PLAN NOTE
Patient is going to start his Lexapro in a week.  He wants to wait and be on his Parkinson's drug for 2 weeks before he starts it.  He will see me a month after he starts it and we will see if it has been helping.  Patient's brother has metastatic colon cancer in his mom has had a stroke.

## 2022-07-19 NOTE — ASSESSMENT & PLAN NOTE
Patient has had nausea since April.  He has it every few days for about couple hours.  He has no pain with it.  He has no chest pain shortness of breath or sweating.  He is not running a fever.  I am going to check a CRP, MAMIE, celiac panel, and return to clinic in 1 month for recheck.

## 2022-07-19 NOTE — ASSESSMENT & PLAN NOTE
Neurologist thinks she may be developing Parkinson's.  It is not definitive at this time but he is starting to develop some symptoms of it.

## 2022-07-19 NOTE — PROGRESS NOTES
Patient Name: Benjie Wheat  : 1960   MRN: [id number]     Chief Complaint:    Chief Complaint   Patient presents with   • Hyperlipidemia   • Hyperglycemia       History of Present Illness: Benjie Wheat is a 61 y.o. male who is here today for follow up on nausea  HPI        Review of Systems:   Review of Systems   Constitutional: Negative.    HENT: Negative.    Eyes: Negative.    Respiratory: Negative.    Cardiovascular: Negative.    Gastrointestinal: Negative.    Neurological: Negative.         Past Medical History:   Past Medical History:   Diagnosis Date   • Blood chemistry abnormality 2010   • Disorder of bilirubin excretion 2010   • Fracture    • Kidney stones 12/10/2010   • Mixed hyperlipidemia 2010       Past Surgical History: History reviewed. No pertinent surgical history.    Family History:   Family History   Family history unknown: Yes       Social History:   Social History     Socioeconomic History   • Marital status:    Tobacco Use   • Smoking status: Never Smoker   • Smokeless tobacco: Never Used   Substance and Sexual Activity   • Alcohol use: Never   • Drug use: Never       Medications:     Current Outpatient Medications:   •  carbidopa-levodopa (SINEMET)  MG per tablet, TAKE 1 AND 1/2 TABLETS BY MOUTH THREE TIMES DAILY, Disp: , Rfl:   •  escitalopram (Lexapro) 10 MG tablet, Take 1 tablet by mouth Daily., Disp: 90 tablet, Rfl: 1  •  vitamin B-12 (CYANOCOBALAMIN) 100 MCG tablet, cyanocobalamin (vit B-12) 1,000 mcg tablet  TK 1 T PO QD, Disp: , Rfl:   •  Vitamin D, Cholecalciferol, (CHOLECALCIFEROL) 10 MCG (400 UNIT) tablet, Take 400 Units by mouth Daily., Disp: , Rfl:   •  primidone (MYSOLINE) 50 MG tablet, Take 2 tablets by mouth Every Night., Disp: 60 tablet, Rfl: 2    Allergies:   No Known Allergies      Physical Exam:  Vital Signs:   Vitals:    22 1124   BP: 118/74   BP Location: Left arm   Patient Position: Sitting   Cuff Size: Adult  "  Pulse: 75   Resp: 12   Temp: 97.8 °F (36.6 °C)   TempSrc: Temporal   SpO2: 97%   Weight: 109 kg (240 lb)   Height: 193 cm (76\")   PainSc: 0-No pain     Body mass index is 29.21 kg/m².     Physical Exam  Vitals and nursing note reviewed.   Constitutional:       Appearance: Normal appearance. He is normal weight.   HENT:      Head: Normocephalic and atraumatic.      Right Ear: Tympanic membrane, ear canal and external ear normal.      Left Ear: Tympanic membrane, ear canal and external ear normal.      Nose: Nose normal.      Mouth/Throat:      Mouth: Mucous membranes are dry.      Pharynx: Oropharynx is clear.   Eyes:      Extraocular Movements: Extraocular movements intact.      Conjunctiva/sclera: Conjunctivae normal.      Pupils: Pupils are equal, round, and reactive to light.   Cardiovascular:      Rate and Rhythm: Normal rate and regular rhythm.      Pulses: Normal pulses.      Heart sounds: Normal heart sounds.   Pulmonary:      Effort: Pulmonary effort is normal.      Breath sounds: Normal breath sounds.   Abdominal:      General: Abdomen is flat. Bowel sounds are normal.      Palpations: Abdomen is soft.   Musculoskeletal:      Cervical back: Normal range of motion and neck supple.   Feet:      Comments:      Neurological:      Mental Status: He is alert.         Procedures      Assessment/Plan:   Diagnoses and all orders for this visit:    1. Nausea (Primary)  Assessment & Plan:  Patient has had nausea since April.  He has it every few days for about couple hours.  He has no pain with it.  He has no chest pain shortness of breath or sweating.  He is not running a fever.  I am going to check a CRP, MAMIE, celiac panel, and return to clinic in 1 month for recheck.    Orders:  -     C-reactive protein; Future  -     MAMIE; Future  -     Cyclic Citrul Peptide Antibody, IgG / IgA; Future    2. Screening due  -     Hepatitis C Antibody; Future  -     C-reactive protein; Future  -     MAMIE; Future  -     Cyclic Citrul " Peptide Antibody, IgG / IgA; Future    3. Adjustment disorder with mixed anxiety and depressed mood  Assessment & Plan:  Patient is going to start his Lexapro in a week.  He wants to wait and be on his Parkinson's drug for 2 weeks before he starts it.  He will see me a month after he starts it and we will see if it has been helping.  Patient's brother has metastatic colon cancer in his mom has had a stroke.    Orders:  -     C-reactive protein; Future  -     MAMIE; Future  -     Cyclic Citrul Peptide Antibody, IgG / IgA; Future    4. Dystonic tremor  Assessment & Plan:  Neurologist thinks she may be developing Parkinson's.  It is not definitive at this time but he is starting to develop some symptoms of it.    Orders:  -     C-reactive protein; Future  -     MAMIE; Future  -     Cyclic Citrul Peptide Antibody, IgG / IgA; Future    5. Hyperglycemia  Assessment & Plan:  A1c is 5.4.  We will follow.    Orders:  -     C-reactive protein; Future  -     MAMIE; Future  -     Cyclic Citrul Peptide Antibody, IgG / IgA; Future    6. Bilateral hand pain  Assessment & Plan:  Anti-CCP antibody.  He has this pain in the middle night.  No morning stiffness.    Orders:  -     C-reactive protein; Future  -     MAMIE; Future  -     Cyclic Citrul Peptide Antibody, IgG / IgA; Future           Follow Up:   No follow-ups on file.    Cruzito Muñoz MD  [facility]     Answers for HPI/ROS submitted by the patient on 7/12/2022  What is the primary reason for your visit?: Other  Please describe your symptoms.: Follow up after blood tests  Have you had these symptoms before?: Yes  How long have you been having these symptoms?: Greater than 2 weeks

## 2022-07-20 LAB
ANA SER QL: NEGATIVE
CRP SERPL-MCNC: <1 MG/L (ref 0–10)
HCV AB S/CO SERPL IA: 0.1 S/CO RATIO (ref 0–0.9)
TTG IGA SER-ACNC: <2 U/ML (ref 0–3)
TTG IGG SER-ACNC: <2 U/ML (ref 0–5)

## 2022-07-22 LAB — CCP IGA+IGG SERPL IA-ACNC: 8 UNITS (ref 0–19)

## 2022-09-15 ENCOUNTER — OFFICE VISIT (OUTPATIENT)
Dept: FAMILY MEDICINE CLINIC | Facility: CLINIC | Age: 62
End: 2022-09-15

## 2022-09-15 VITALS
SYSTOLIC BLOOD PRESSURE: 120 MMHG | TEMPERATURE: 98 F | RESPIRATION RATE: 12 BRPM | WEIGHT: 238.6 LBS | HEART RATE: 96 BPM | DIASTOLIC BLOOD PRESSURE: 80 MMHG | HEIGHT: 76 IN | OXYGEN SATURATION: 97 % | BODY MASS INDEX: 29.06 KG/M2

## 2022-09-15 DIAGNOSIS — Z12.5 SCREENING FOR PROSTATE CANCER: ICD-10-CM

## 2022-09-15 DIAGNOSIS — E78.2 HYPERLIPIDEMIA, MIXED: Primary | ICD-10-CM

## 2022-09-15 DIAGNOSIS — E55.9 VITAMIN D DEFICIENCY: ICD-10-CM

## 2022-09-15 DIAGNOSIS — G25.2 DYSTONIC TREMOR: ICD-10-CM

## 2022-09-15 DIAGNOSIS — M79.642 BILATERAL HAND PAIN: ICD-10-CM

## 2022-09-15 DIAGNOSIS — F43.23 ADJUSTMENT DISORDER WITH MIXED ANXIETY AND DEPRESSED MOOD: ICD-10-CM

## 2022-09-15 DIAGNOSIS — R73.9 HYPERGLYCEMIA: ICD-10-CM

## 2022-09-15 DIAGNOSIS — E53.8 VITAMIN B12 DEFICIENCY: ICD-10-CM

## 2022-09-15 DIAGNOSIS — M79.641 BILATERAL HAND PAIN: ICD-10-CM

## 2022-09-15 DIAGNOSIS — Z80.0 FAMILY HISTORY OF COLON CANCER: ICD-10-CM

## 2022-09-15 PROCEDURE — 99214 OFFICE O/P EST MOD 30 MIN: CPT | Performed by: FAMILY MEDICINE

## 2022-09-15 NOTE — ASSESSMENT & PLAN NOTE
[facility].  They have not told me he has Parkinson's.  But they are treating him with antiparkinson drugs.

## 2022-09-15 NOTE — PROGRESS NOTES
Patient Name: Benjie Wheat  : 1960   MRN: [id number]     Chief Complaint:    Chief Complaint   Patient presents with   • Med Refill     Pt here for medication refills and recheck       History of Present Illness: Benjie Wheat is a 61 y.o. male who is here today for follow up on hand pain and adjustment disorder  HPI        Review of Systems:   Review of Systems   Constitutional: Negative.    HENT: Negative.    Eyes: Negative.    Respiratory: Negative.    Cardiovascular: Negative.    Gastrointestinal: Negative.    Neurological: Negative.         Past Medical History:   Past Medical History:   Diagnosis Date   • Blood chemistry abnormality 2010   • Disorder of bilirubin excretion 2010   • Fracture    • Kidney stones 12/10/2010   • Mixed hyperlipidemia 2010       Past Surgical History: History reviewed. No pertinent surgical history.    Family History:   Family History   Family history unknown: Yes       Social History:   Social History     Socioeconomic History   • Marital status:    Tobacco Use   • Smoking status: Never Smoker   • Smokeless tobacco: Never Used   Substance and Sexual Activity   • Alcohol use: Never   • Drug use: Never       Medications:     Current Outpatient Medications:   •  carbidopa-levodopa (SINEMET)  MG per tablet, TAKE 1 AND 1/2 TABLETS BY MOUTH THREE TIMES DAILY, Disp: , Rfl:   •  escitalopram (Lexapro) 10 MG tablet, Take 1 tablet by mouth Daily., Disp: 90 tablet, Rfl: 1  •  Vitamin D, Cholecalciferol, (CHOLECALCIFEROL) 10 MCG (400 UNIT) tablet, Take 400 Units by mouth Daily., Disp: , Rfl:   •  primidone (MYSOLINE) 50 MG tablet, Take 2 tablets by mouth Every Night., Disp: 60 tablet, Rfl: 2  •  vitamin B-12 (CYANOCOBALAMIN) 100 MCG tablet, cyanocobalamin (vit B-12) 1,000 mcg tablet  TK 1 T PO QD, Disp: , Rfl:     Allergies:   No Known Allergies      Physical Exam:  Vital Signs:   Vitals:    09/15/22 1020   BP: 120/80   BP Location: Left  "arm   Patient Position: Sitting   Cuff Size: Adult   Pulse: 96   Resp: 12   Temp: 98 °F (36.7 °C)   TempSrc: Temporal   SpO2: 97%   Weight: 108 kg (238 lb 9.6 oz)   Height: 193 cm (76\")   PainSc: 0-No pain     Body mass index is 29.04 kg/m².     Physical Exam  Vitals and nursing note reviewed.   Constitutional:       Appearance: Normal appearance. He is normal weight.   HENT:      Head: Normocephalic and atraumatic.      Right Ear: Tympanic membrane, ear canal and external ear normal.      Left Ear: Tympanic membrane, ear canal and external ear normal.      Nose: Nose normal.      Mouth/Throat:      Mouth: Mucous membranes are dry.      Pharynx: Oropharynx is clear.   Eyes:      Extraocular Movements: Extraocular movements intact.      Conjunctiva/sclera: Conjunctivae normal.      Pupils: Pupils are equal, round, and reactive to light.   Cardiovascular:      Rate and Rhythm: Normal rate and regular rhythm.      Pulses: Normal pulses.      Heart sounds: Normal heart sounds.   Pulmonary:      Effort: Pulmonary effort is normal.      Breath sounds: Normal breath sounds.   Musculoskeletal:      Cervical back: Normal range of motion and neck supple.   Feet:      Comments:      Neurological:      Mental Status: He is alert.         Procedures      Assessment/Plan:   Diagnoses and all orders for this visit:    1. Hyperlipidemia, mixed (Primary)  Assessment & Plan:  Blood work in 6 months.    Orders:  -     Comprehensive Metabolic Panel; Future  -     Lipid Panel; Future  -     CBC & Differential; Future  -     Hemoglobin A1c; Future  -     PSA Screen; Future  -     Vitamin B12; Future  -     Vitamin D 25 Hydroxy; Future  -     TSH Rfx On Abnormal To Free T4; Future    2. Vitamin D deficiency  Assessment & Plan:  Blood work in 6 months.    Orders:  -     Comprehensive Metabolic Panel; Future  -     Lipid Panel; Future  -     CBC & Differential; Future  -     Hemoglobin A1c; Future  -     PSA Screen; Future  -     Vitamin " B12; Future  -     Vitamin D 25 Hydroxy; Future  -     TSH Rfx On Abnormal To Free T4; Future    3. Vitamin B12 deficiency  Assessment & Plan:  Blood work in 6 months.    Orders:  -     Comprehensive Metabolic Panel; Future  -     Lipid Panel; Future  -     CBC & Differential; Future  -     Hemoglobin A1c; Future  -     PSA Screen; Future  -     Vitamin B12; Future  -     Vitamin D 25 Hydroxy; Future  -     TSH Rfx On Abnormal To Free T4; Future    4. Hyperglycemia  Assessment & Plan:  Blood work in 6 months.    Orders:  -     Comprehensive Metabolic Panel; Future  -     Lipid Panel; Future  -     CBC & Differential; Future  -     Hemoglobin A1c; Future  -     PSA Screen; Future  -     Vitamin B12; Future  -     Vitamin D 25 Hydroxy; Future  -     TSH Rfx On Abnormal To Free T4; Future    5. Family history of colon cancer  Assessment & Plan:  Colonoscopy is up-to-date.    Orders:  -     Comprehensive Metabolic Panel; Future  -     Lipid Panel; Future  -     CBC & Differential; Future  -     Hemoglobin A1c; Future  -     PSA Screen; Future  -     Vitamin B12; Future  -     Vitamin D 25 Hydroxy; Future  -     TSH Rfx On Abnormal To Free T4; Future    6. Bilateral hand pain  Assessment & Plan:  Patient had a negative anti-CCP.  His hand pain is gotten better.  We will follow.    Orders:  -     Comprehensive Metabolic Panel; Future  -     Lipid Panel; Future  -     CBC & Differential; Future  -     Hemoglobin A1c; Future  -     PSA Screen; Future  -     Vitamin B12; Future  -     Vitamin D 25 Hydroxy; Future  -     TSH Rfx On Abnormal To Free T4; Future    7. Dystonic tremor  Assessment & Plan:  Care neurology.  They have not told me he has Parkinson's.  But they are treating him with antiparkinson drugs.    Orders:  -     Comprehensive Metabolic Panel; Future  -     Lipid Panel; Future  -     CBC & Differential; Future  -     Hemoglobin A1c; Future  -     PSA Screen; Future  -     Vitamin B12; Future  -     Vitamin D  25 Hydroxy; Future  -     TSH Rfx On Abnormal To Free T4; Future    8. Adjustment disorder with mixed anxiety and depressed mood  Assessment & Plan:  Doing better on Lexapro.  We will follow.    Orders:  -     Comprehensive Metabolic Panel; Future  -     Lipid Panel; Future  -     CBC & Differential; Future  -     Hemoglobin A1c; Future  -     PSA Screen; Future  -     Vitamin B12; Future  -     Vitamin D 25 Hydroxy; Future  -     TSH Rfx On Abnormal To Free T4; Future    9. Screening for prostate cancer  -     Comprehensive Metabolic Panel; Future  -     Lipid Panel; Future  -     CBC & Differential; Future  -     Hemoglobin A1c; Future  -     PSA Screen; Future  -     Vitamin B12; Future  -     Vitamin D 25 Hydroxy; Future  -     TSH Rfx On Abnormal To Free T4; Future           Follow Up:   Return in about 6 months (around 3/15/2023) for Bloodwork 1 week prior to next appointment.    Cruzito Muñoz MD  [facility] [place] [facility]

## 2022-11-17 ENCOUNTER — TELEPHONE (OUTPATIENT)
Dept: FAMILY MEDICINE CLINIC | Facility: CLINIC | Age: 62
End: 2022-11-17

## 2022-11-17 NOTE — TELEPHONE ENCOUNTER
Called pt and wants to know if he could  take the medication for COVID if so could you send in and let me know and I will call pt to inform.

## 2022-11-17 NOTE — TELEPHONE ENCOUNTER
Caller: Benjie Wheat    Relationship: Self    Best call back number:   [phone]          What is the best time to reach you:ANYTIME    Who are you requesting to speak with (clinical staff, provider,  specific staff member):PCP OR MA    Do you know the name of the person who called:     What was the call regarding: PATIENT CALLED IN REQUESTING A CALLBACK FROM PCP HE STATED ON 11/15/22 HE TESTED POSITIVE FOR COVID AT [facility] HIS SYMPTOMS CONSIST OF SORE THROAT HEADACHE FEVER CHILLS PATIENT FURTHER STATED HE WOULD LIKE MEDICATION FOR TREATMENT OF HIS SYMPTOMS    Do you require a callback: YES

## 2022-11-18 NOTE — TELEPHONE ENCOUNTER
Called pharmacist and discussed treatment of COVID. Pt has been sick for 3 days. Reviewed GFR with pharmacist and will give him reduced dose of Paxlovid

## 2022-12-22 ENCOUNTER — OFFICE VISIT (OUTPATIENT)
Dept: FAMILY MEDICINE CLINIC | Facility: CLINIC | Age: 62
End: 2022-12-22

## 2022-12-22 VITALS
HEIGHT: 76 IN | WEIGHT: 242.5 LBS | DIASTOLIC BLOOD PRESSURE: 88 MMHG | SYSTOLIC BLOOD PRESSURE: 130 MMHG | HEART RATE: 108 BPM | OXYGEN SATURATION: 99 % | BODY MASS INDEX: 29.53 KG/M2

## 2022-12-22 DIAGNOSIS — G25.2 DYSTONIC TREMOR: ICD-10-CM

## 2022-12-22 DIAGNOSIS — R00.2 PALPITATIONS: Primary | ICD-10-CM

## 2022-12-22 DIAGNOSIS — F43.23 ADJUSTMENT DISORDER WITH MIXED ANXIETY AND DEPRESSED MOOD: ICD-10-CM

## 2022-12-22 PROCEDURE — 36415 COLL VENOUS BLD VENIPUNCTURE: CPT | Performed by: FAMILY MEDICINE

## 2022-12-22 PROCEDURE — 93000 ELECTROCARDIOGRAM COMPLETE: CPT | Performed by: FAMILY MEDICINE

## 2022-12-22 PROCEDURE — 99214 OFFICE O/P EST MOD 30 MIN: CPT | Performed by: FAMILY MEDICINE

## 2022-12-22 RX ORDER — HYDROCODONE BITARTRATE AND ACETAMINOPHEN 5; 325 MG/1; MG/1
1 TABLET ORAL EVERY 6 HOURS PRN
COMMUNITY
Start: 2022-09-25 | End: 2022-12-22

## 2022-12-22 RX ORDER — TAMSULOSIN HYDROCHLORIDE 0.4 MG/1
1 CAPSULE ORAL DAILY
COMMUNITY
Start: 2022-09-25 | End: 2022-12-22

## 2022-12-22 RX ORDER — LORAZEPAM 1 MG/1
1 TABLET ORAL NIGHTLY PRN
COMMUNITY
Start: 2022-11-11 | End: 2022-12-22

## 2022-12-22 NOTE — PROGRESS NOTES
"       Patient Name: Benjie Wheat  : 1960   MRN: [id number]     Chief Complaint:    Chief Complaint   Patient presents with   • Palpitations       History of Present Illness: Benjie Wheat is a 61 y.o. male who is here today for follow up on heart palpitations  HPI        Review of Systems:   Review of Systems   Constitutional: Negative.    HENT: Negative.    Eyes: Negative.    Respiratory: Negative.    Cardiovascular: Positive for palpitations.   Gastrointestinal: Negative.    Neurological: Negative.         Past Medical History:   Past Medical History:   Diagnosis Date   • Blood chemistry abnormality 2010   • Disorder of bilirubin excretion 2010   • Fracture    • Kidney stones 12/10/2010   • Mixed hyperlipidemia 2010       Past Surgical History: History reviewed. No pertinent surgical history.    Family History:   Family History   Family history unknown: Yes       Social History:   Social History     Socioeconomic History   • Marital status:    Tobacco Use   • Smoking status: Never   • Smokeless tobacco: Never   Vaping Use   • Vaping Use: Never used   Substance and Sexual Activity   • Alcohol use: Never   • Drug use: Never       Medications:     Current Outpatient Medications:   •  carbidopa-levodopa (SINEMET)  MG per tablet, TAKE 1 AND 1/2 TABLETS BY MOUTH THREE TIMES DAILY, Disp: , Rfl:   •  escitalopram (Lexapro) 10 MG tablet, Take 1 tablet by mouth Daily., Disp: 90 tablet, Rfl: 1  •  primidone (MYSOLINE) 50 MG tablet, Take 2 tablets by mouth Every Night., Disp: 60 tablet, Rfl: 2    Allergies:   No Known Allergies      Physical Exam:  Vital Signs:   Vitals:    22 1447   BP: 130/88   BP Location: Right arm   Patient Position: Sitting   Cuff Size: Large Adult   Pulse: 108   SpO2: 99%   Weight: 110 kg (242 lb 8 oz)   Height: 193 cm (76\")   PainSc: 0-No pain     Body mass index is 29.52 kg/m².     Physical Exam  Vitals and nursing note reviewed. "   Constitutional:       Appearance: Normal appearance. He is normal weight.   HENT:      Head: Normocephalic and atraumatic.      Right Ear: Tympanic membrane, ear canal and external ear normal.      Left Ear: Tympanic membrane, ear canal and external ear normal.      Nose: Nose normal.      Mouth/Throat:      Mouth: Mucous membranes are dry.      Pharynx: Oropharynx is clear.   Eyes:      Extraocular Movements: Extraocular movements intact.      Conjunctiva/sclera: Conjunctivae normal.      Pupils: Pupils are equal, round, and reactive to light.   Cardiovascular:      Rate and Rhythm: Normal rate and regular rhythm.      Pulses: Normal pulses.      Heart sounds: Normal heart sounds.   Pulmonary:      Effort: Pulmonary effort is normal.      Breath sounds: Normal breath sounds.   Musculoskeletal:      Cervical back: Normal range of motion and neck supple.   Feet:      Comments:      Neurological:      Mental Status: He is alert.           ECG 12 Lead    Date/Time: 12/22/2022 3:25 PM  Performed by: Cruzito Muñoz MD  Authorized by: Cruzito Muñoz MD   Rhythm: sinus rhythm  Rate: normal  ST Segments: ST segments normal  T Waves: T waves normal  Other: no other findings    Clinical impression: normal ECG  Comments: No acute sign of ischemia              Assessment/Plan:   Diagnoses and all orders for this visit:    1. Palpitations (Primary)  Assessment & Plan:  Patient had his carbidopa levodopa increased.  He has recently lost his brother and his mom.  He was having some heart palpitations.  No chest pain just palpitations.  These palpitations got better with decreasing his carbidopa levodopa back to his original dose.    EKG shows normal sinus rhythm.  I am going to hold him at his current dose of carbidopa levodopa.  I am going to get a CBC CMP and TSH.  I am also going to get a Holter monitor and a stress test.  Return to clinic in 2 weeks for recheck.  I told him if the symptoms come back to go to the  emergency room.    Orders:  -     Holter monitor - 48 hour; Future  -     Treadmill Stress Test; Future  -     CBC & Differential; Future  -     Comprehensive Metabolic Panel; Future  -     TSH Rfx On Abnormal To Free T4; Future    2. Dystonic tremor  Assessment & Plan:  Patient has been seeing neurologist.      3. Adjustment disorder with mixed anxiety and depressed mood  Assessment & Plan:  Continue medications.    Orders:  -     CBC & Differential; Future  -     Comprehensive Metabolic Panel; Future  -     TSH Rfx On Abnormal To Free T4; Future    Other orders  -     ECG 12 Lead           Follow Up:   No follow-ups on file.    Cruzito Muñoz MD  [facility]     Answers for HPI/ROS submitted by the patient on 12/19/2022  What is the primary reason for your visit?: Other  Please describe your symptoms.: I have been experiencing heart/chest palpitations and fluttering for the past few weeks.  Have you had these symptoms before?: No  How long have you been having these symptoms?: Greater than 2 weeks  Please list any medications you are currently taking for this condition.: None  Please describe any probable cause for these symptoms. : Not sure. Possibly caused by medication I am taking for tremors/parkinsons called Carbidopa/Levodopa. Could also be caused by stress and anxiety I have experienced lately with death of mother and brother.

## 2022-12-22 NOTE — ASSESSMENT & PLAN NOTE
Patient had his carbidopa levodopa increased.  He has recently lost his brother and his mom.  He was having some heart palpitations.  No chest pain just palpitations.  These palpitations got better with decreasing his carbidopa levodopa back to his original dose.    EKG shows normal sinus rhythm.  I am going to hold him at his current dose of carbidopa levodopa.  I am going to get a CBC CMP and TSH.  I am also going to get a Holter monitor and a stress test.  Return to clinic in 2 weeks for recheck.  I told him if the symptoms come back to go to the emergency room.

## 2022-12-23 LAB
ALBUMIN SERPL-MCNC: 4.5 G/DL (ref 3.8–4.8)
ALBUMIN/GLOB SERPL: 1.5 {RATIO} (ref 1.2–2.2)
ALP SERPL-CCNC: 68 IU/L (ref 44–121)
ALT SERPL-CCNC: 7 IU/L (ref 0–44)
AST SERPL-CCNC: 14 IU/L (ref 0–40)
BASOPHILS # BLD AUTO: 0 X10E3/UL (ref 0–0.2)
BASOPHILS NFR BLD AUTO: 1 %
BILIRUB SERPL-MCNC: 1 MG/DL (ref 0–1.2)
BUN SERPL-MCNC: 15 MG/DL (ref 8–27)
BUN/CREAT SERPL: 12 (ref 10–24)
CALCIUM SERPL-MCNC: 9.3 MG/DL (ref 8.6–10.2)
CHLORIDE SERPL-SCNC: 104 MMOL/L (ref 96–106)
CO2 SERPL-SCNC: 23 MMOL/L (ref 20–29)
CREAT SERPL-MCNC: 1.27 MG/DL (ref 0.76–1.27)
EGFRCR SERPLBLD CKD-EPI 2021: 64 ML/MIN/1.73
EOSINOPHIL # BLD AUTO: 0.3 X10E3/UL (ref 0–0.4)
EOSINOPHIL NFR BLD AUTO: 4 %
ERYTHROCYTE [DISTWIDTH] IN BLOOD BY AUTOMATED COUNT: 12.7 % (ref 11.6–15.4)
GLOBULIN SER CALC-MCNC: 3 G/DL (ref 1.5–4.5)
GLUCOSE SERPL-MCNC: 104 MG/DL (ref 70–99)
HCT VFR BLD AUTO: 48.7 % (ref 37.5–51)
HGB BLD-MCNC: 16.2 G/DL (ref 13–17.7)
IMM GRANULOCYTES # BLD AUTO: 0.1 X10E3/UL (ref 0–0.1)
IMM GRANULOCYTES NFR BLD AUTO: 1 %
LYMPHOCYTES # BLD AUTO: 1.8 X10E3/UL (ref 0.7–3.1)
LYMPHOCYTES NFR BLD AUTO: 27 %
MCH RBC QN AUTO: 29.5 PG (ref 26.6–33)
MCHC RBC AUTO-ENTMCNC: 33.3 G/DL (ref 31.5–35.7)
MCV RBC AUTO: 89 FL (ref 79–97)
MONOCYTES # BLD AUTO: 0.7 X10E3/UL (ref 0.1–0.9)
MONOCYTES NFR BLD AUTO: 10 %
NEUTROPHILS # BLD AUTO: 3.9 X10E3/UL (ref 1.4–7)
NEUTROPHILS NFR BLD AUTO: 57 %
PLATELET # BLD AUTO: 358 X10E3/UL (ref 150–450)
POTASSIUM SERPL-SCNC: 4.2 MMOL/L (ref 3.5–5.2)
PROT SERPL-MCNC: 7.5 G/DL (ref 6–8.5)
RBC # BLD AUTO: 5.5 X10E6/UL (ref 4.14–5.8)
SODIUM SERPL-SCNC: 141 MMOL/L (ref 134–144)
TSH SERPL DL<=0.005 MIU/L-ACNC: 2.15 UIU/ML (ref 0.45–4.5)
WBC # BLD AUTO: 6.7 X10E3/UL (ref 3.4–10.8)

## 2023-01-16 ENCOUNTER — OFFICE VISIT (OUTPATIENT)
Dept: FAMILY MEDICINE CLINIC | Facility: CLINIC | Age: 63
End: 2023-01-16
Payer: COMMERCIAL

## 2023-01-16 VITALS
RESPIRATION RATE: 12 BRPM | OXYGEN SATURATION: 98 % | TEMPERATURE: 98 F | WEIGHT: 240 LBS | BODY MASS INDEX: 29.22 KG/M2 | HEART RATE: 83 BPM | SYSTOLIC BLOOD PRESSURE: 115 MMHG | HEIGHT: 76 IN | DIASTOLIC BLOOD PRESSURE: 80 MMHG

## 2023-01-16 DIAGNOSIS — Z12.5 SCREENING FOR PROSTATE CANCER: ICD-10-CM

## 2023-01-16 DIAGNOSIS — R00.2 PALPITATIONS: ICD-10-CM

## 2023-01-16 DIAGNOSIS — F43.23 ADJUSTMENT DISORDER WITH MIXED ANXIETY AND DEPRESSED MOOD: ICD-10-CM

## 2023-01-16 DIAGNOSIS — N18.31 CHRONIC KIDNEY DISEASE, STAGE 3A: ICD-10-CM

## 2023-01-16 DIAGNOSIS — E53.8 VITAMIN B12 DEFICIENCY: ICD-10-CM

## 2023-01-16 DIAGNOSIS — E55.9 VITAMIN D DEFICIENCY: ICD-10-CM

## 2023-01-16 DIAGNOSIS — R73.9 HYPERGLYCEMIA: ICD-10-CM

## 2023-01-16 DIAGNOSIS — G25.2 DYSTONIC TREMOR: ICD-10-CM

## 2023-01-16 DIAGNOSIS — Z00.00 PHYSICAL EXAM: Primary | ICD-10-CM

## 2023-01-16 PROCEDURE — 99396 PREV VISIT EST AGE 40-64: CPT | Performed by: FAMILY MEDICINE

## 2023-01-16 NOTE — PROGRESS NOTES
Patient Name: Benjie Wheat  : 1960   MRN: [id number]     Chief Complaint:    Chief Complaint   Patient presents with   • Annual Exam     Pt  here for yearly exam   • Hyperlipidemia       History of Present Illness: Benjie Wheat is a 62 y.o. male who is here today for their annual health maintenance and physical.           Review of Systems:   Review of Systems   Constitutional: Negative.    HENT: Negative.    Eyes: Negative.    Respiratory: Negative.    Cardiovascular: Negative.    Gastrointestinal: Negative.    Neurological: Negative.        Past Medical History, Social History, Family History and Care Team were all reviewed with patient and updated as appropriate.     Medications:     Current Outpatient Medications:   •  carbidopa-levodopa (SINEMET)  MG per tablet, TAKE 1 AND 1/2 TABLETS BY MOUTH THREE TIMES DAILY, Disp: , Rfl:   •  escitalopram (Lexapro) 10 MG tablet, Take 1 tablet by mouth Daily., Disp: 90 tablet, Rfl: 1  •  primidone (MYSOLINE) 50 MG tablet, Take 2 tablets by mouth Every Night., Disp: 60 tablet, Rfl: 2    Allergies:   No Known Allergies    Health Maintenance   Topic Date Due   • ZOSTER VACCINE (1 of 2) Never done   • COVID-19 Vaccine (5 - Booster for Pfizer series) 07/15/2022   • INFLUENZA VACCINE  2023 (Originally 2022)   • LIPID PANEL  2023   • ANNUAL PHYSICAL  2024   • COLORECTAL CANCER SCREENING  2030   • TDAP/TD VACCINES (2 - Td or Tdap) 2032   • HEPATITIS C SCREENING  Completed   • Pneumococcal Vaccine 0-64  Aged Out        PHQ-2 Total Score:          Intimate partner violence: (Screen on initial visit, older adult with injury or evidence of neglect):  • Violence can be a problem in many people's lives, so I now ask every patient about trauma or abuse they may have experienced in a relationship.  • Stress/Safety - Do you feel safe in your relationship?  • Afraid/Abused - Have you ever been in a relationship where you were  "threatened, hurt, or afraid?  • Friend/Family - Are your friends aware you have been hurt?  • Emergency Plan - Do you have a safe place to go and the resources you need in an emergency?    Osteoporosis:   • Men: history of low trauma fracture, androgen deprivation therapy for prostate cancer, hypogonadism, primary hyperparathyroidism, intestinal disorders.       Physical Exam:  Vital Signs:   Vitals:    01/16/23 1035   BP: 115/80   BP Location: Left arm   Patient Position: Sitting   Cuff Size: Adult   Pulse: 83   Resp: 12   Temp: 98 °F (36.7 °C)   TempSrc: Temporal   SpO2: 98%   Weight: 109 kg (240 lb)   Height: 193 cm (76\")   PainSc: 0-No pain     Body mass index is 29.21 kg/m².     Physical Exam  Vitals and nursing note reviewed.   Constitutional:       Appearance: Normal appearance. He is normal weight.   HENT:      Head: Normocephalic and atraumatic.      Right Ear: Tympanic membrane, ear canal and external ear normal.      Left Ear: Tympanic membrane, ear canal and external ear normal.      Nose: Nose normal.      Mouth/Throat:      Mouth: Mucous membranes are moist.      Pharynx: Oropharynx is clear.   Eyes:      Extraocular Movements: Extraocular movements intact.      Conjunctiva/sclera: Conjunctivae normal.      Pupils: Pupils are equal, round, and reactive to light.   Cardiovascular:      Rate and Rhythm: Normal rate and regular rhythm.      Pulses: Normal pulses.      Heart sounds: Normal heart sounds.   Pulmonary:      Effort: Pulmonary effort is normal.      Breath sounds: Normal breath sounds.   Abdominal:      General: Abdomen is flat. Bowel sounds are normal.      Palpations: Abdomen is soft.   Musculoskeletal:         General: Normal range of motion.      Cervical back: Normal range of motion and neck supple.   Feet:      Comments:      Skin:     General: Skin is warm and dry.   Neurological:      General: No focal deficit present.      Mental Status: He is alert and oriented to person, place, and " time.   Psychiatric:         Mood and Affect: Mood normal.         Behavior: Behavior normal.         Thought Content: Thought content normal.         Judgment: Judgment normal.         Procedures      Assessment/Plan:   Diagnoses and all orders for this visit:    1. Physical exam (Primary)  Assessment & Plan:  Discussed immunization    Orders:  -     Comprehensive Metabolic Panel; Future  -     Lipid Panel; Future  -     CBC & Differential; Future  -     Hemoglobin A1c; Future  -     PSA Screen; Future  -     Vitamin B12; Future  -     Vitamin D,25-Hydroxy; Future  -     TSH Rfx On Abnormal To Free T4; Future    2. Palpitations  Assessment & Plan:  Holter monitor and stress test were normal.  We will follow.    Orders:  -     Comprehensive Metabolic Panel; Future  -     Lipid Panel; Future  -     CBC & Differential; Future  -     Hemoglobin A1c; Future  -     PSA Screen; Future  -     Vitamin B12; Future  -     Vitamin D,25-Hydroxy; Future  -     TSH Rfx On Abnormal To Free T4; Future    3. Dystonic tremor  Assessment & Plan:  Under care of neurology.    Orders:  -     Comprehensive Metabolic Panel; Future  -     Lipid Panel; Future  -     CBC & Differential; Future  -     Hemoglobin A1c; Future  -     PSA Screen; Future  -     Vitamin B12; Future  -     Vitamin D,25-Hydroxy; Future  -     TSH Rfx On Abnormal To Free T4; Future    4. Adjustment disorder with mixed anxiety and depressed mood  Assessment & Plan:  Patient recently lost mother and brother.  Continue medications    Orders:  -     Comprehensive Metabolic Panel; Future  -     Lipid Panel; Future  -     CBC & Differential; Future  -     Hemoglobin A1c; Future  -     PSA Screen; Future  -     Vitamin B12; Future  -     Vitamin D,25-Hydroxy; Future  -     TSH Rfx On Abnormal To Free T4; Future    5. Vitamin D deficiency  Assessment & Plan:  Blood work in 6 months    Orders:  -     Comprehensive Metabolic Panel; Future  -     Lipid Panel; Future  -     CBC &  Differential; Future  -     Hemoglobin A1c; Future  -     PSA Screen; Future  -     Vitamin B12; Future  -     Vitamin D,25-Hydroxy; Future  -     TSH Rfx On Abnormal To Free T4; Future    6. Vitamin B12 deficiency  Assessment & Plan:  Blood work in 6 months    Orders:  -     Comprehensive Metabolic Panel; Future  -     Lipid Panel; Future  -     CBC & Differential; Future  -     Hemoglobin A1c; Future  -     PSA Screen; Future  -     Vitamin B12; Future  -     Vitamin D,25-Hydroxy; Future  -     TSH Rfx On Abnormal To Free T4; Future    7. Hyperglycemia  Assessment & Plan:  Blood work in 6 months    Orders:  -     Comprehensive Metabolic Panel; Future  -     Lipid Panel; Future  -     CBC & Differential; Future  -     Hemoglobin A1c; Future  -     PSA Screen; Future  -     Vitamin B12; Future  -     Vitamin D,25-Hydroxy; Future  -     TSH Rfx On Abnormal To Free T4; Future    8. Chronic kidney disease, stage 3a (HCC)  Assessment & Plan:  GFR 64.Patient is instructed to not take any NSAIDs.  Medicines as directed.  Stay well-hydrated.      Orders:  -     Comprehensive Metabolic Panel; Future  -     Lipid Panel; Future  -     CBC & Differential; Future  -     Hemoglobin A1c; Future  -     PSA Screen; Future  -     Vitamin B12; Future  -     Vitamin D,25-Hydroxy; Future  -     TSH Rfx On Abnormal To Free T4; Future    9. Screening for prostate cancer  Assessment & Plan:  Discussed immunization    Orders:  -     Comprehensive Metabolic Panel; Future  -     Lipid Panel; Future  -     CBC & Differential; Future  -     Hemoglobin A1c; Future  -     PSA Screen; Future  -     Vitamin B12; Future  -     Vitamin D,25-Hydroxy; Future  -     TSH Rfx On Abnormal To Free T4; Future             Follow Up:   No follow-ups on file.    Healthcare Maintenance:   Counseling provided on immunizations.  Benjie Wheat voices understanding and acceptance of this advice and will call back with any further questions or concerns. AVS with  preventive healthcare tips printed for patient.     Cruzito Muñoz MD  [facility] [place] [facility]   Answers for HPI/ROS submitted by the patient on 1/9/2023  What is the primary reason for your visit?: Other  Please describe your symptoms.: follow up from stress test and heart monitor ordered  Have you had these symptoms before?: No  How long have you been having these symptoms?: Greater than 2 weeks

## 2023-01-16 NOTE — ASSESSMENT & PLAN NOTE
GFR 64.Patient is instructed to not take any NSAIDs.  Medicines as directed.  Stay well-hydrated.

## 2023-07-14 PROBLEM — K13.70 ORAL LESION: Status: ACTIVE | Noted: 2023-07-14

## 2023-07-14 PROBLEM — R06.02 SHORTNESS OF BREATH: Status: ACTIVE | Noted: 2023-07-14

## 2023-07-31 ENCOUNTER — OFFICE VISIT (OUTPATIENT)
Dept: FAMILY MEDICINE CLINIC | Facility: CLINIC | Age: 63
End: 2023-07-31
Payer: COMMERCIAL

## 2023-07-31 VITALS
RESPIRATION RATE: 16 BRPM | HEIGHT: 76 IN | SYSTOLIC BLOOD PRESSURE: 116 MMHG | OXYGEN SATURATION: 98 % | BODY MASS INDEX: 29.99 KG/M2 | HEART RATE: 116 BPM | WEIGHT: 246.3 LBS | DIASTOLIC BLOOD PRESSURE: 82 MMHG

## 2023-07-31 DIAGNOSIS — I26.99 PULMONARY EMBOLISM WITHOUT ACUTE COR PULMONALE, UNSPECIFIED CHRONICITY, UNSPECIFIED PULMONARY EMBOLISM TYPE: Primary | ICD-10-CM

## 2023-07-31 DIAGNOSIS — K13.70 ORAL LESION: ICD-10-CM

## 2023-07-31 DIAGNOSIS — E55.9 VITAMIN D DEFICIENCY: ICD-10-CM

## 2023-07-31 DIAGNOSIS — Z80.0 FAMILY HISTORY OF COLON CANCER: ICD-10-CM

## 2023-07-31 DIAGNOSIS — E53.8 VITAMIN B12 DEFICIENCY: ICD-10-CM

## 2023-07-31 PROCEDURE — 99214 OFFICE O/P EST MOD 30 MIN: CPT | Performed by: FAMILY MEDICINE

## 2023-08-14 ENCOUNTER — LAB (OUTPATIENT)
Dept: FAMILY MEDICINE CLINIC | Facility: CLINIC | Age: 63
End: 2023-08-14
Payer: COMMERCIAL

## 2023-08-14 DIAGNOSIS — I26.99 PULMONARY EMBOLISM WITHOUT ACUTE COR PULMONALE, UNSPECIFIED CHRONICITY, UNSPECIFIED PULMONARY EMBOLISM TYPE: ICD-10-CM

## 2023-08-15 LAB
ALBUMIN SERPL-MCNC: 4.1 G/DL (ref 3.9–4.9)
ALBUMIN/GLOB SERPL: 1.5 {RATIO} (ref 1.2–2.2)
ALP SERPL-CCNC: 51 IU/L (ref 44–121)
ALT SERPL-CCNC: 6 IU/L (ref 0–44)
AST SERPL-CCNC: 16 IU/L (ref 0–40)
BASOPHILS # BLD AUTO: 0.1 X10E3/UL (ref 0–0.2)
BASOPHILS NFR BLD AUTO: 1 %
BILIRUB SERPL-MCNC: 0.8 MG/DL (ref 0–1.2)
BUN SERPL-MCNC: 15 MG/DL (ref 8–27)
BUN/CREAT SERPL: 11 (ref 10–24)
CALCIUM SERPL-MCNC: 9 MG/DL (ref 8.6–10.2)
CHLORIDE SERPL-SCNC: 106 MMOL/L (ref 96–106)
CO2 SERPL-SCNC: 21 MMOL/L (ref 20–29)
CREAT SERPL-MCNC: 1.31 MG/DL (ref 0.76–1.27)
EGFRCR SERPLBLD CKD-EPI 2021: 62 ML/MIN/1.73
EOSINOPHIL # BLD AUTO: 0.2 X10E3/UL (ref 0–0.4)
EOSINOPHIL NFR BLD AUTO: 3 %
ERYTHROCYTE [DISTWIDTH] IN BLOOD BY AUTOMATED COUNT: 12.5 % (ref 11.6–15.4)
GLOBULIN SER CALC-MCNC: 2.8 G/DL (ref 1.5–4.5)
GLUCOSE SERPL-MCNC: 100 MG/DL (ref 70–99)
HCT VFR BLD AUTO: 47 % (ref 37.5–51)
HGB BLD-MCNC: 16 G/DL (ref 13–17.7)
IMM GRANULOCYTES # BLD AUTO: 0 X10E3/UL (ref 0–0.1)
IMM GRANULOCYTES NFR BLD AUTO: 1 %
LYMPHOCYTES # BLD AUTO: 1.5 X10E3/UL (ref 0.7–3.1)
LYMPHOCYTES NFR BLD AUTO: 28 %
MCH RBC QN AUTO: 29.9 PG (ref 26.6–33)
MCHC RBC AUTO-ENTMCNC: 34 G/DL (ref 31.5–35.7)
MCV RBC AUTO: 88 FL (ref 79–97)
MONOCYTES # BLD AUTO: 0.6 X10E3/UL (ref 0.1–0.9)
MONOCYTES NFR BLD AUTO: 10 %
NEUTROPHILS # BLD AUTO: 3.2 X10E3/UL (ref 1.4–7)
NEUTROPHILS NFR BLD AUTO: 57 %
PLATELET # BLD AUTO: 372 X10E3/UL (ref 150–450)
POTASSIUM SERPL-SCNC: 4.3 MMOL/L (ref 3.5–5.2)
PROT SERPL-MCNC: 6.9 G/DL (ref 6–8.5)
RBC # BLD AUTO: 5.35 X10E6/UL (ref 4.14–5.8)
SODIUM SERPL-SCNC: 143 MMOL/L (ref 134–144)
WBC # BLD AUTO: 5.5 X10E3/UL (ref 3.4–10.8)

## 2023-09-06 ENCOUNTER — OFFICE VISIT (OUTPATIENT)
Dept: FAMILY MEDICINE CLINIC | Facility: CLINIC | Age: 63
End: 2023-09-06
Payer: COMMERCIAL

## 2023-09-06 DIAGNOSIS — I26.99 PULMONARY EMBOLISM WITHOUT ACUTE COR PULMONALE, UNSPECIFIED CHRONICITY, UNSPECIFIED PULMONARY EMBOLISM TYPE: Primary | ICD-10-CM

## 2023-09-06 DIAGNOSIS — Z80.0 FAMILY HISTORY OF COLON CANCER: ICD-10-CM

## 2023-09-06 DIAGNOSIS — G25.2 DYSTONIC TREMOR: ICD-10-CM

## 2023-09-06 DIAGNOSIS — K13.70 ORAL LESION: ICD-10-CM

## 2023-09-06 DIAGNOSIS — E78.2 HYPERLIPIDEMIA, MIXED: ICD-10-CM

## 2023-09-06 DIAGNOSIS — E55.9 VITAMIN D DEFICIENCY: ICD-10-CM

## 2023-09-06 DIAGNOSIS — R73.9 HYPERGLYCEMIA: ICD-10-CM

## 2023-09-06 DIAGNOSIS — E53.8 VITAMIN B12 DEFICIENCY: ICD-10-CM

## 2023-09-06 NOTE — PROGRESS NOTES
Patient Name: Benjie Wheat  : 1960   MRN: [id number]     Chief Complaint: Patient to follow-up on pulmonary embolus oral lesion and Blood work    History of Present Illness: Benjie Wheat is a 62 y.o. male who is here today for follow up.  HPI        Review of Systems:   Review of Systems   Constitutional: Negative.    HENT: Negative.     Eyes: Negative.    Respiratory: Negative.     Cardiovascular: Negative.    Gastrointestinal: Negative.    Neurological: Negative.       Past Medical History:   Past Medical History:   Diagnosis Date    Blood chemistry abnormality 2010    Disorder of bilirubin excretion 2010    Fracture     Kidney stones 12/10/2010    Mixed hyperlipidemia 2010    Parkinson's disease 2019    Pulmonary embolism 2023    Tremor 2019       Past Surgical History:   Past Surgical History:   Procedure Laterality Date    CHOLECYSTECTOMY      COLONOSCOPY      FRACTURE SURGERY         Family History:   Family History   Problem Relation Age of Onset    Cancer Father         Colon at age 64    Cancer Brother         Colon age 57       Social History:   Social History     Socioeconomic History    Marital status:    Tobacco Use    Smoking status: Never    Smokeless tobacco: Never   Vaping Use    Vaping Use: Never used   Substance and Sexual Activity    Alcohol use: Never    Drug use: Never    Sexual activity: Yes     Partners: Female       Medications:     Current Outpatient Medications:     apixaban (ELIQUIS) 5 MG tablet tablet, Take 1 tablet by mouth 2 (Two) Times a Day., Disp: , Rfl:     carbidopa-levodopa (SINEMET)  MG per tablet, TAKE 1 AND 1/2 TABLETS BY MOUTH THREE TIMES DAILY, Disp: , Rfl:     Allergies:   No Known Allergies      Physical Exam:  Vital Signs: There were no vitals filed for this visit.  There is no height or weight on file to calculate BMI.     Physical Exam  Vitals and nursing note reviewed.   Constitutional:        Appearance: Normal appearance. He is normal weight.   HENT:      Head: Normocephalic and atraumatic.      Right Ear: Tympanic membrane, ear canal and external ear normal.      Left Ear: Tympanic membrane, ear canal and external ear normal.      Nose: Nose normal.      Mouth/Throat:      Mouth: Mucous membranes are dry.      Pharynx: Oropharynx is clear.   Eyes:      Extraocular Movements: Extraocular movements intact.      Conjunctiva/sclera: Conjunctivae normal.      Pupils: Pupils are equal, round, and reactive to light.   Cardiovascular:      Rate and Rhythm: Normal rate and regular rhythm.      Pulses: Normal pulses.      Heart sounds: Normal heart sounds.   Pulmonary:      Effort: Pulmonary effort is normal.      Breath sounds: Normal breath sounds.   Musculoskeletal:      Cervical back: Normal range of motion and neck supple.   Feet:      Comments:      Neurological:      Mental Status: He is alert.       Procedures      Assessment/Plan:   Diagnoses and all orders for this visit:    1. Pulmonary embolism without acute cor pulmonale, unspecified chronicity, unspecified pulmonary embolism type (Primary)  Assessment & Plan:  Patient seeing hematologist.  We do not have an etiology for the blood clot.  Patient had a CT of abdomen and pelvis at [facility] that showed a cyst in his liver and some granulomas in the lung but no sign of cancer.  He is going to get a colonoscopy to ensure he does not have colon cancer and we will recheck in January.      2. Hyperlipidemia, mixed  Assessment & Plan:  Blood work in January      3. Hyperglycemia  Assessment & Plan:  Blood work      4. Vitamin B12 deficiency    5. Vitamin D deficiency    6. Family history of colon cancer  Assessment & Plan:  Been to GI for colonoscopy.      7. Dystonic tremor  Assessment & Plan:  Since seeing neurologist at .      8. Oral lesion  Assessment & Plan:  Granuloma.               Follow Up:   Return in about 3 months (around 12/6/2023) for Annual  physical, Bloodwork 1 week prior to next appointment.    Cruzito Muñoz MD  [facility]   Answers submitted by the patient for this visit:  Primary Reason for Visit (Submitted on 9/1/2023)  What is the primary reason for your visit?: Other  Other (Submitted on 9/1/2023)  Please describe your symptoms.: Follow up regarding pulmonary embolisms  Have you had these symptoms before?: Yes  How long have you been having these symptoms?: Greater than 2 weeks  Please list any medications you are currently taking for this condition.: Eliquis  Please describe any probable cause for these symptoms. : Unknown

## 2023-09-06 NOTE — ASSESSMENT & PLAN NOTE
Patient seeing hematologist.  We do not have an etiology for the blood clot.  Patient had a CT of abdomen and pelvis at [facility] that showed a cyst in his liver and some granulomas in the lung but no sign of cancer.  He is going to get a colonoscopy to ensure he does not have colon cancer and we will recheck in January.

## 2023-11-02 ENCOUNTER — OFFICE VISIT (OUTPATIENT)
Dept: GASTROENTEROLOGY | Facility: CLINIC | Age: 63
End: 2023-11-02
Payer: COMMERCIAL

## 2023-11-02 VITALS
HEART RATE: 99 BPM | HEIGHT: 76 IN | TEMPERATURE: 96.9 F | SYSTOLIC BLOOD PRESSURE: 159 MMHG | BODY MASS INDEX: 30.03 KG/M2 | WEIGHT: 246.6 LBS | DIASTOLIC BLOOD PRESSURE: 93 MMHG

## 2023-11-02 DIAGNOSIS — I26.99 ACUTE PULMONARY EMBOLISM, UNSPECIFIED PULMONARY EMBOLISM TYPE, UNSPECIFIED WHETHER ACUTE COR PULMONALE PRESENT: Primary | ICD-10-CM

## 2023-11-02 DIAGNOSIS — Z80.0 FAMILY HISTORY OF COLON CANCER: ICD-10-CM

## 2023-11-02 NOTE — PROGRESS NOTES
GASTROENTEROLOGY OFFICE NOTE  Benjie Wheat  [id number]  1960    CARE TEAM  Patient Care Team:  Cruzito Muñoz MD as PCP - General (Family Medicine)    Referring Provider: Cruzito Muñoz MD    Chief Complaint   Patient presents with    Pulmonary Embolism    FHx Colon Cancer        HISTORY OF PRESENT ILLNESS:  Mr. Wheat is a 62-year-old male with a history of parkinsonism and hyperlipidemia and a recent pulmonary embolism (2023) currently on Eliquis 5 mg twice daily.  Etiology for pulmonary embolism is unknown.  He had a CT at [facility] while hospitalized for PE which showed a cyst in his liver and some granulomas in the lung but no signs of cancer.  He has been seeing hematology, thrombophilia work-up was nondiagnostic.    He has a family history of colon cancer.  His father  at the age of 62 and his brother  at the age of 58.  He is up-to-date on his colonoscopies, his last colonoscopy was 2 years ago.  Again, cause of recent pulmonary embolism has yet to be found, there is concern of occult cancer as this could be a cause for PE.    PAST MEDICAL HISTORY  Past Medical History:   Diagnosis Date    Blood chemistry abnormality 2010    Disorder of bilirubin excretion 2010    Fracture     Kidney stones 12/10/2010    Mixed hyperlipidemia 2010    Parkinson's disease 2019    Pulmonary embolism 2023    Tremor 2019        PAST SURGICAL HISTORY  Past Surgical History:   Procedure Laterality Date    CHOLECYSTECTOMY      COLONOSCOPY      FRACTURE SURGERY      UPPER GASTROINTESTINAL ENDOSCOPY          MEDICATIONS:    Current Outpatient Medications:     apixaban (ELIQUIS) 5 MG tablet tablet, Take 1 tablet by mouth 2 (Two) Times a Day., Disp: , Rfl:     carbidopa-levodopa (SINEMET)  MG per tablet, TAKE 1 AND 1/2 TABLETS BY MOUTH THREE TIMES DAILY, Disp: , Rfl:     ALLERGIES  No Known Allergies    FAMILY HISTORY:  Family History   Problem Relation Age of Onset  "   Colon cancer Father     Cancer Father         Colon at age 64    Colon cancer Brother     Cancer Brother         Colon age 57       SOCIAL HISTORY  Social History     Socioeconomic History    Marital status:    Tobacco Use    Smoking status: Never    Smokeless tobacco: Never   Vaping Use    Vaping Use: Never used   Substance and Sexual Activity    Alcohol use: Never    Drug use: Never    Sexual activity: Yes     Partners: Female         PHYSICAL EXAM   /93 (BP Location: Left arm, Patient Position: Sitting, Cuff Size: Adult)   Pulse 99   Temp 96.9 °F (36.1 °C) (Temporal)   Ht 193 cm (75.98\")   Wt 112 kg (246 lb 9.6 oz)   BMI 30.03 kg/m²   Physical Exam  Constitutional:       Appearance: Normal appearance.   HENT:      Head: Normocephalic and atraumatic.   Pulmonary:      Effort: Pulmonary effort is normal.   Neurological:      Mental Status: He is alert and oriented to person, place, and time.   Psychiatric:         Mood and Affect: Mood normal.         Thought Content: Thought content normal.           ASSESSMENT / PLAN  Diagnoses and all orders for this visit:    1. Family history of colon cancer (Primary)    2. Acute pulmonary embolism, unspecified pulmonary embolism type, unspecified whether acute cor pulmonale present    -Colonoscopy to rule out colon neoplasia.  He is currently on Eliquis and cannot stop this prior to his procedure.      I discussed the patients findings and my recommendations with patient    FLYNN Partida                    "

## 2024-01-02 RX ORDER — SODIUM, POTASSIUM,MAG SULFATES 17.5-3.13G
2 SOLUTION, RECONSTITUTED, ORAL ORAL TAKE AS DIRECTED
Qty: 354 ML | Refills: 0 | Status: SHIPPED | OUTPATIENT
Start: 2024-01-02

## 2024-01-04 ENCOUNTER — LAB (OUTPATIENT)
Dept: FAMILY MEDICINE CLINIC | Facility: CLINIC | Age: 64
End: 2024-01-04
Payer: COMMERCIAL

## 2024-01-04 DIAGNOSIS — R73.9 HYPERGLYCEMIA: ICD-10-CM

## 2024-01-04 DIAGNOSIS — G25.2 DYSTONIC TREMOR: ICD-10-CM

## 2024-01-04 DIAGNOSIS — E53.8 VITAMIN B12 DEFICIENCY: ICD-10-CM

## 2024-01-04 DIAGNOSIS — I26.99 PULMONARY EMBOLISM WITHOUT ACUTE COR PULMONALE, UNSPECIFIED CHRONICITY, UNSPECIFIED PULMONARY EMBOLISM TYPE: ICD-10-CM

## 2024-01-04 DIAGNOSIS — Z80.0 FAMILY HISTORY OF COLON CANCER: ICD-10-CM

## 2024-01-04 DIAGNOSIS — E55.9 VITAMIN D DEFICIENCY: ICD-10-CM

## 2024-01-04 DIAGNOSIS — E78.2 HYPERLIPIDEMIA, MIXED: ICD-10-CM

## 2024-01-04 DIAGNOSIS — K13.70 ORAL LESION: ICD-10-CM

## 2024-01-05 LAB
25(OH)D3+25(OH)D2 SERPL-MCNC: 28.7 NG/ML (ref 30–100)
ALBUMIN SERPL-MCNC: 3.9 G/DL (ref 3.9–4.9)
ALBUMIN/GLOB SERPL: 1.4 {RATIO} (ref 1.2–2.2)
ALP SERPL-CCNC: 62 IU/L (ref 44–121)
ALT SERPL-CCNC: 24 IU/L (ref 0–44)
AST SERPL-CCNC: 18 IU/L (ref 0–40)
BASOPHILS # BLD AUTO: 0.1 X10E3/UL (ref 0–0.2)
BASOPHILS NFR BLD AUTO: 1 %
BILIRUB SERPL-MCNC: 0.7 MG/DL (ref 0–1.2)
BUN SERPL-MCNC: 16 MG/DL (ref 8–27)
BUN/CREAT SERPL: 14 (ref 10–24)
CALCIUM SERPL-MCNC: 9.1 MG/DL (ref 8.6–10.2)
CHLORIDE SERPL-SCNC: 106 MMOL/L (ref 96–106)
CHOLEST SERPL-MCNC: 200 MG/DL (ref 100–199)
CO2 SERPL-SCNC: 20 MMOL/L (ref 20–29)
CREAT SERPL-MCNC: 1.16 MG/DL (ref 0.76–1.27)
EGFRCR SERPLBLD CKD-EPI 2021: 71 ML/MIN/1.73
EOSINOPHIL # BLD AUTO: 0.2 X10E3/UL (ref 0–0.4)
EOSINOPHIL NFR BLD AUTO: 3 %
ERYTHROCYTE [DISTWIDTH] IN BLOOD BY AUTOMATED COUNT: 13 % (ref 11.6–15.4)
GLOBULIN SER CALC-MCNC: 2.8 G/DL (ref 1.5–4.5)
GLUCOSE SERPL-MCNC: 103 MG/DL (ref 70–99)
HBA1C MFR BLD: 5.7 % (ref 4.8–5.6)
HCT VFR BLD AUTO: 47.5 % (ref 37.5–51)
HDLC SERPL-MCNC: 63 MG/DL
HGB BLD-MCNC: 16.1 G/DL (ref 13–17.7)
IMM GRANULOCYTES # BLD AUTO: 0.1 X10E3/UL (ref 0–0.1)
IMM GRANULOCYTES NFR BLD AUTO: 1 %
LDLC SERPL CALC-MCNC: 120 MG/DL (ref 0–99)
LYMPHOCYTES # BLD AUTO: 1.7 X10E3/UL (ref 0.7–3.1)
LYMPHOCYTES NFR BLD AUTO: 27 %
MCH RBC QN AUTO: 29.7 PG (ref 26.6–33)
MCHC RBC AUTO-ENTMCNC: 33.9 G/DL (ref 31.5–35.7)
MCV RBC AUTO: 88 FL (ref 79–97)
MONOCYTES # BLD AUTO: 0.7 X10E3/UL (ref 0.1–0.9)
MONOCYTES NFR BLD AUTO: 11 %
NEUTROPHILS # BLD AUTO: 3.6 X10E3/UL (ref 1.4–7)
NEUTROPHILS NFR BLD AUTO: 57 %
PLATELET # BLD AUTO: 382 X10E3/UL (ref 150–450)
POTASSIUM SERPL-SCNC: 4 MMOL/L (ref 3.5–5.2)
PROT SERPL-MCNC: 6.7 G/DL (ref 6–8.5)
RBC # BLD AUTO: 5.42 X10E6/UL (ref 4.14–5.8)
SODIUM SERPL-SCNC: 141 MMOL/L (ref 134–144)
TRIGL SERPL-MCNC: 98 MG/DL (ref 0–149)
TSH SERPL DL<=0.005 MIU/L-ACNC: 3.05 UIU/ML (ref 0.45–4.5)
VIT B12 SERPL-MCNC: 327 PG/ML (ref 232–1245)
VLDLC SERPL CALC-MCNC: 17 MG/DL (ref 5–40)
WBC # BLD AUTO: 6.4 X10E3/UL (ref 3.4–10.8)

## 2024-01-09 ENCOUNTER — OUTSIDE FACILITY SERVICE (OUTPATIENT)
Dept: GASTROENTEROLOGY | Facility: CLINIC | Age: 64
End: 2024-01-09
Payer: COMMERCIAL

## 2024-01-09 PROCEDURE — 45378 DIAGNOSTIC COLONOSCOPY: CPT | Performed by: INTERNAL MEDICINE

## 2024-01-11 ENCOUNTER — OFFICE VISIT (OUTPATIENT)
Dept: FAMILY MEDICINE CLINIC | Facility: CLINIC | Age: 64
End: 2024-01-11
Payer: COMMERCIAL

## 2024-01-11 VITALS
DIASTOLIC BLOOD PRESSURE: 74 MMHG | HEIGHT: 76 IN | RESPIRATION RATE: 16 BRPM | SYSTOLIC BLOOD PRESSURE: 116 MMHG | HEART RATE: 102 BPM | OXYGEN SATURATION: 98 % | BODY MASS INDEX: 30.42 KG/M2 | WEIGHT: 249.8 LBS

## 2024-01-11 DIAGNOSIS — Z12.5 SCREENING FOR PROSTATE CANCER: ICD-10-CM

## 2024-01-11 DIAGNOSIS — I26.99 PULMONARY EMBOLISM WITHOUT ACUTE COR PULMONALE, UNSPECIFIED CHRONICITY, UNSPECIFIED PULMONARY EMBOLISM TYPE: ICD-10-CM

## 2024-01-11 DIAGNOSIS — E78.2 HYPERLIPIDEMIA, MIXED: ICD-10-CM

## 2024-01-11 DIAGNOSIS — E66.01 MORBID (SEVERE) OBESITY DUE TO EXCESS CALORIES: ICD-10-CM

## 2024-01-11 DIAGNOSIS — E53.8 VITAMIN B12 DEFICIENCY: ICD-10-CM

## 2024-01-11 DIAGNOSIS — R73.9 HYPERGLYCEMIA: Primary | ICD-10-CM

## 2024-01-11 DIAGNOSIS — N18.31 CHRONIC KIDNEY DISEASE, STAGE 3A: ICD-10-CM

## 2024-01-11 DIAGNOSIS — M79.671 CHRONIC HEEL PAIN, RIGHT: ICD-10-CM

## 2024-01-11 DIAGNOSIS — G89.29 CHRONIC HEEL PAIN, RIGHT: ICD-10-CM

## 2024-01-11 DIAGNOSIS — E55.9 VITAMIN D DEFICIENCY: ICD-10-CM

## 2024-01-11 NOTE — PROGRESS NOTES
Patient Name: Benjie Wheat  : 1960   MRN: [id number]     Chief Complaint:    Chief Complaint   Patient presents with    Follow-up    RT Heal Pain    Heart rate     Elevated        History of Present Illness: Benjie Wheat is a 63 y.o. male who is here today for follow up.  HPI        Review of Systems:   Review of Systems   Constitutional: Negative.    HENT: Negative.     Eyes: Negative.    Respiratory: Negative.     Cardiovascular: Negative.    Gastrointestinal: Negative.    Neurological: Negative.         Past Medical History:   Past Medical History:   Diagnosis Date    Blood chemistry abnormality 2010    Disorder of bilirubin excretion 2010    Fracture     Kidney stones 12/10/2010    Mixed hyperlipidemia 2010    Parkinson's disease 2019    Pulmonary embolism 2023    Tremor 2019       Past Surgical History:   Past Surgical History:   Procedure Laterality Date    CHOLECYSTECTOMY      COLONOSCOPY      FRACTURE SURGERY      UPPER GASTROINTESTINAL ENDOSCOPY         Family History:   Family History   Problem Relation Age of Onset    Colon cancer Father     Cancer Father         Colon at age 64    Colon cancer Brother     Cancer Brother         Colon age 57       Social History:   Social History     Socioeconomic History    Marital status:    Tobacco Use    Smoking status: Never    Smokeless tobacco: Never   Vaping Use    Vaping Use: Never used   Substance and Sexual Activity    Alcohol use: Never    Drug use: Never    Sexual activity: Yes     Partners: Female       Medications:     Current Outpatient Medications:     apixaban (ELIQUIS) 5 MG tablet tablet, Take 1 tablet by mouth 2 (Two) Times a Day., Disp: , Rfl:     carbidopa-levodopa (SINEMET)  MG per tablet, TAKE 1 AND 1/2 TABLETS BY MOUTH THREE TIMES DAILY, Disp: , Rfl:     Allergies:   No Known Allergies      Physical Exam:  Vital Signs:   Vitals:    24 0833   BP: 116/74   BP Location: Left  "arm   Patient Position: Sitting   Cuff Size: Adult   Pulse: 102   Resp: 16   SpO2: 98%   Weight: 113 kg (249 lb 12.8 oz)   Height: 193 cm (75.98\")   PainSc: 0-No pain     Body mass index is 30.42 kg/m².     Physical Exam  Vitals and nursing note reviewed.   Constitutional:       Appearance: Normal appearance. He is normal weight.   HENT:      Head: Normocephalic and atraumatic.      Right Ear: Tympanic membrane, ear canal and external ear normal.      Left Ear: Tympanic membrane, ear canal and external ear normal.      Nose: Nose normal.      Mouth/Throat:      Mouth: Mucous membranes are dry.      Pharynx: Oropharynx is clear.   Eyes:      Extraocular Movements: Extraocular movements intact.      Conjunctiva/sclera: Conjunctivae normal.      Pupils: Pupils are equal, round, and reactive to light.   Cardiovascular:      Rate and Rhythm: Normal rate and regular rhythm.      Pulses: Normal pulses.      Heart sounds: Normal heart sounds.   Pulmonary:      Effort: Pulmonary effort is normal.      Breath sounds: Normal breath sounds.   Musculoskeletal:      Cervical back: Normal range of motion and neck supple.   Feet:      Comments:      Neurological:      Mental Status: He is alert.           ECG 12 Lead    Date/Time: 1/11/2024 8:58 AM  Performed by: Cruziot Muñoz MD    Authorized by: rCuzito Muñoz MD  Comparison: compared with previous ECG from 7/14/2023  Comparison to previous ECG: No sig change  Rhythm: sinus rhythm  Rate: normal  Conduction: 1st degree AV block  ST Segments: ST segments normal  T Waves: T waves normal    Clinical impression: non-specific ECG  Comments: First degree AV block            Assessment/Plan:   Diagnoses and all orders for this visit:    1. Hyperglycemia (Primary)  Assessment & Plan:  A1c 5.7.  Diet and exercise.    Orders:  -     Comprehensive Metabolic Panel; Future  -     Lipid Panel; Future  -     CBC & Differential; Future  -     Hemoglobin A1c; Future  -     PSA Screen; " Future  -     Vitamin B12; Future  -     Vitamin D,25-Hydroxy; Future  -     TSH Rfx On Abnormal To Free T4; Future    2. Hyperlipidemia, mixed  Assessment & Plan:  HDL 63.  .  He is going to follow Mediterranean diet.  Recheck in 6 months.  He had a coronary angiogram that showed clean coronary arteries.    Orders:  -     Comprehensive Metabolic Panel; Future  -     Lipid Panel; Future  -     CBC & Differential; Future  -     Hemoglobin A1c; Future  -     PSA Screen; Future  -     Vitamin B12; Future  -     Vitamin D,25-Hydroxy; Future  -     TSH Rfx On Abnormal To Free T4; Future    3. Vitamin B12 deficiency  Assessment & Plan:  Vitamin B12 is 3 27.  Recheck in 6 months.    Orders:  -     Comprehensive Metabolic Panel; Future  -     Lipid Panel; Future  -     CBC & Differential; Future  -     Hemoglobin A1c; Future  -     PSA Screen; Future  -     Vitamin B12; Future  -     Vitamin D,25-Hydroxy; Future  -     TSH Rfx On Abnormal To Free T4; Future    4. Vitamin D deficiency  Assessment & Plan:  Vitamin D is 28.7.  We will replace and recheck in 6 months.    Orders:  -     Comprehensive Metabolic Panel; Future  -     Lipid Panel; Future  -     CBC & Differential; Future  -     Hemoglobin A1c; Future  -     PSA Screen; Future  -     Vitamin B12; Future  -     Vitamin D,25-Hydroxy; Future  -     TSH Rfx On Abnormal To Free T4; Future    5. Chronic kidney disease, stage 3a  -     Comprehensive Metabolic Panel; Future  -     Lipid Panel; Future  -     CBC & Differential; Future  -     Hemoglobin A1c; Future  -     PSA Screen; Future  -     Vitamin B12; Future  -     Vitamin D,25-Hydroxy; Future  -     TSH Rfx On Abnormal To Free T4; Future    6. Chronic heel pain, right  -     Comprehensive Metabolic Panel; Future  -     Lipid Panel; Future  -     CBC & Differential; Future  -     Hemoglobin A1c; Future  -     PSA Screen; Future  -     Vitamin B12; Future  -     Vitamin D,25-Hydroxy; Future  -     TSH Rfx On  Abnormal To Free T4; Future    7. Morbid (severe) obesity due to excess calories  Assessment & Plan:  Patient's (Body mass index is 30.42 kg/m².) indicates that they are obese (BMI >30) with health conditions that include dyslipidemias . Weight is worsening. BMI  is above average; BMI management plan is completed. We discussed portion control and increasing exercise.     Orders:  -     Comprehensive Metabolic Panel; Future  -     Lipid Panel; Future  -     CBC & Differential; Future  -     Hemoglobin A1c; Future  -     PSA Screen; Future  -     Vitamin B12; Future  -     Vitamin D,25-Hydroxy; Future  -     TSH Rfx On Abnormal To Free T4; Future    8. Pulmonary embolism without acute cor pulmonale, unspecified chronicity, unspecified pulmonary embolism type  Assessment & Plan:  Patient has been taking his Eliquis regularly.  He has not missed a dose.  He has appoint with hematology next week.  He has no symptoms at this time.  EKG shows a heart rate of 86 with first-degree AV block but no sign of ischemia or strain.    Orders:  -     Comprehensive Metabolic Panel; Future  -     Lipid Panel; Future  -     CBC & Differential; Future  -     Hemoglobin A1c; Future  -     PSA Screen; Future  -     Vitamin B12; Future  -     Vitamin D,25-Hydroxy; Future  -     TSH Rfx On Abnormal To Free T4; Future    9. Screening for prostate cancer  -     Comprehensive Metabolic Panel; Future  -     Lipid Panel; Future  -     CBC & Differential; Future  -     Hemoglobin A1c; Future  -     PSA Screen; Future  -     Vitamin B12; Future  -     Vitamin D,25-Hydroxy; Future  -     TSH Rfx On Abnormal To Free T4; Future    Other orders  -     ECG 12 Lead             Follow Up:   Return in about 6 months (around 7/11/2024) for Annual physical, Bloodwork 1 week prior to next appointment.    Cruzito Muñoz MD  [facility]     Answers submitted by the patient for this visit:  Primary Reason for Visit (Submitted on  1/4/2024)  What is the primary reason for your visit?: Other  Other (Submitted on 1/4/2024)  Please describe your symptoms.: Follow up from last visit to monitor pulmonary embolisms  Have you had these symptoms before?: No  How long have you been having these symptoms?: Greater than 2 weeks  Please list any medications you are currently taking for this condition.: Eliquis  Please describe any probable cause for these symptoms. : [place]

## 2024-01-11 NOTE — ASSESSMENT & PLAN NOTE
HDL 63.  .  He is going to follow Mediterranean diet.  Recheck in 6 months.  He had a coronary angiogram that showed clean coronary arteries.

## 2024-01-11 NOTE — ASSESSMENT & PLAN NOTE
Patient's (Body mass index is 30.42 kg/m².) indicates that they are obese (BMI >30) with health conditions that include dyslipidemias . Weight is worsening. BMI  is above average; BMI management plan is completed. We discussed portion control and increasing exercise.

## 2024-01-11 NOTE — ASSESSMENT & PLAN NOTE
Patient has been taking his Eliquis regularly.  He has not missed a dose.  He has appoint with hematology next week.  He has no symptoms at this time.  EKG shows a heart rate of 86 with first-degree AV block but no sign of ischemia or strain.

## 2024-07-03 ENCOUNTER — LAB (OUTPATIENT)
Dept: FAMILY MEDICINE CLINIC | Facility: CLINIC | Age: 64
End: 2024-07-03
Payer: COMMERCIAL

## 2024-07-03 DIAGNOSIS — E66.01 MORBID (SEVERE) OBESITY DUE TO EXCESS CALORIES: ICD-10-CM

## 2024-07-03 DIAGNOSIS — E53.8 VITAMIN B12 DEFICIENCY: ICD-10-CM

## 2024-07-03 DIAGNOSIS — E55.9 VITAMIN D DEFICIENCY: ICD-10-CM

## 2024-07-03 DIAGNOSIS — G89.29 CHRONIC HEEL PAIN, RIGHT: ICD-10-CM

## 2024-07-03 DIAGNOSIS — Z12.5 SCREENING FOR PROSTATE CANCER: ICD-10-CM

## 2024-07-03 DIAGNOSIS — N18.31 CHRONIC KIDNEY DISEASE, STAGE 3A: ICD-10-CM

## 2024-07-03 DIAGNOSIS — E78.2 HYPERLIPIDEMIA, MIXED: ICD-10-CM

## 2024-07-03 DIAGNOSIS — M79.671 CHRONIC HEEL PAIN, RIGHT: ICD-10-CM

## 2024-07-03 DIAGNOSIS — R73.9 HYPERGLYCEMIA: ICD-10-CM

## 2024-07-03 DIAGNOSIS — I26.99 PULMONARY EMBOLISM WITHOUT ACUTE COR PULMONALE, UNSPECIFIED CHRONICITY, UNSPECIFIED PULMONARY EMBOLISM TYPE: ICD-10-CM

## 2024-07-03 PROCEDURE — 36415 COLL VENOUS BLD VENIPUNCTURE: CPT | Performed by: FAMILY MEDICINE

## 2024-07-04 LAB
25(OH)D3+25(OH)D2 SERPL-MCNC: 30.1 NG/ML (ref 30–100)
ALBUMIN SERPL-MCNC: 4 G/DL (ref 3.9–4.9)
ALP SERPL-CCNC: 56 IU/L (ref 44–121)
ALT SERPL-CCNC: 15 IU/L (ref 0–44)
AST SERPL-CCNC: 15 IU/L (ref 0–40)
BASOPHILS # BLD AUTO: 0.1 X10E3/UL (ref 0–0.2)
BASOPHILS NFR BLD AUTO: 1 %
BILIRUB SERPL-MCNC: 1 MG/DL (ref 0–1.2)
BUN SERPL-MCNC: 19 MG/DL (ref 8–27)
BUN/CREAT SERPL: 14 (ref 10–24)
CALCIUM SERPL-MCNC: 9.2 MG/DL (ref 8.6–10.2)
CHLORIDE SERPL-SCNC: 107 MMOL/L (ref 96–106)
CHOLEST SERPL-MCNC: 191 MG/DL (ref 100–199)
CO2 SERPL-SCNC: 21 MMOL/L (ref 20–29)
CREAT SERPL-MCNC: 1.32 MG/DL (ref 0.76–1.27)
EGFRCR SERPLBLD CKD-EPI 2021: 61 ML/MIN/1.73
EOSINOPHIL # BLD AUTO: 0.1 X10E3/UL (ref 0–0.4)
EOSINOPHIL NFR BLD AUTO: 3 %
ERYTHROCYTE [DISTWIDTH] IN BLOOD BY AUTOMATED COUNT: 13 % (ref 11.6–15.4)
GLOBULIN SER CALC-MCNC: 2.9 G/DL (ref 1.5–4.5)
GLUCOSE SERPL-MCNC: 97 MG/DL (ref 70–99)
HBA1C MFR BLD: 5.8 % (ref 4.8–5.6)
HCT VFR BLD AUTO: 49.5 % (ref 37.5–51)
HDLC SERPL-MCNC: 55 MG/DL
HGB BLD-MCNC: 16.5 G/DL (ref 13–17.7)
IMM GRANULOCYTES # BLD AUTO: 0 X10E3/UL (ref 0–0.1)
IMM GRANULOCYTES NFR BLD AUTO: 1 %
LDLC SERPL CALC-MCNC: 119 MG/DL (ref 0–99)
LYMPHOCYTES # BLD AUTO: 1.8 X10E3/UL (ref 0.7–3.1)
LYMPHOCYTES NFR BLD AUTO: 36 %
MCH RBC QN AUTO: 29.8 PG (ref 26.6–33)
MCHC RBC AUTO-ENTMCNC: 33.3 G/DL (ref 31.5–35.7)
MCV RBC AUTO: 89 FL (ref 79–97)
MONOCYTES # BLD AUTO: 0.6 X10E3/UL (ref 0.1–0.9)
MONOCYTES NFR BLD AUTO: 12 %
NEUTROPHILS # BLD AUTO: 2.4 X10E3/UL (ref 1.4–7)
NEUTROPHILS NFR BLD AUTO: 47 %
PLATELET # BLD AUTO: 319 X10E3/UL (ref 150–450)
POTASSIUM SERPL-SCNC: 4.5 MMOL/L (ref 3.5–5.2)
PROT SERPL-MCNC: 6.9 G/DL (ref 6–8.5)
PSA SERPL-MCNC: 1 NG/ML (ref 0–4)
RBC # BLD AUTO: 5.54 X10E6/UL (ref 4.14–5.8)
SODIUM SERPL-SCNC: 141 MMOL/L (ref 134–144)
TRIGL SERPL-MCNC: 96 MG/DL (ref 0–149)
TSH SERPL DL<=0.005 MIU/L-ACNC: 2.49 UIU/ML (ref 0.45–4.5)
VIT B12 SERPL-MCNC: 329 PG/ML (ref 232–1245)
VLDLC SERPL CALC-MCNC: 17 MG/DL (ref 5–40)
WBC # BLD AUTO: 5 X10E3/UL (ref 3.4–10.8)

## 2024-07-11 ENCOUNTER — OFFICE VISIT (OUTPATIENT)
Dept: FAMILY MEDICINE CLINIC | Facility: CLINIC | Age: 64
End: 2024-07-11
Payer: COMMERCIAL

## 2024-07-11 VITALS
WEIGHT: 214.9 LBS | RESPIRATION RATE: 16 BRPM | SYSTOLIC BLOOD PRESSURE: 132 MMHG | OXYGEN SATURATION: 98 % | DIASTOLIC BLOOD PRESSURE: 84 MMHG | HEART RATE: 101 BPM | HEIGHT: 76 IN | BODY MASS INDEX: 26.17 KG/M2

## 2024-07-11 DIAGNOSIS — E78.2 HYPERLIPIDEMIA, MIXED: Primary | ICD-10-CM

## 2024-07-11 DIAGNOSIS — N18.31 CHRONIC KIDNEY DISEASE, STAGE 3A: ICD-10-CM

## 2024-07-11 DIAGNOSIS — E55.9 VITAMIN D DEFICIENCY: ICD-10-CM

## 2024-07-11 DIAGNOSIS — R73.9 HYPERGLYCEMIA: ICD-10-CM

## 2024-07-11 DIAGNOSIS — I47.20 VENTRICULAR TACHYCARDIA: ICD-10-CM

## 2024-07-11 PROCEDURE — 99214 OFFICE O/P EST MOD 30 MIN: CPT | Performed by: FAMILY MEDICINE

## 2024-07-11 NOTE — ASSESSMENT & PLAN NOTE
HDL 55.  .  I talked to patient about starting a low-dose statin.  He is going to think about it.  We had a long discussion about endothelial dysfunction.

## 2024-07-11 NOTE — ASSESSMENT & PLAN NOTE
Patient had an episode in March of shortness of breath through the weekend.  He was seen in [place] and had a pulmonary embolism ruled out.  He saw cardiologist who did a stress test that was negative.  The cardiologist did a 2-week monitor and thought there was an 18 beat run of ventricular tachycardia.    Patient did not have a PE, and he did not have any structural heart disease.  I do not have a reason for his shortness of breath however some type of arrhythmia could explain this.  With his abnormal monitor I am going to have him see an electrophysiologist.  His regular cardiologist is in [place].  He has only seen him one time.  I will get him appoint with Dr. Chen.

## 2024-07-11 NOTE — PROGRESS NOTES
Patient Name: Benjie Wheat  : 1960   MRN: [id number]     Chief Complaint:    Chief Complaint   Patient presents with    Annual Exam       History of Present Illness: Benjie Wheat is a 63 y.o. male who is here today for follow up.  HPI        Review of Systems:   Review of Systems   Constitutional: Negative.    HENT: Negative.     Eyes: Negative.    Respiratory: Negative.     Cardiovascular: Negative.    Gastrointestinal: Negative.    Neurological: Negative.         Past Medical History:   Past Medical History:   Diagnosis Date    Blood chemistry abnormality 2010    Disorder of bilirubin excretion 2010    Fracture     Kidney stones 12/10/2010    Mixed hyperlipidemia 2010    Parkinson's disease 2019    Pulmonary embolism 2023    Tremor 2019       Past Surgical History:   Past Surgical History:   Procedure Laterality Date    CHOLECYSTECTOMY      COLONOSCOPY      FRACTURE SURGERY      UPPER GASTROINTESTINAL ENDOSCOPY         Family History:   Family History   Problem Relation Age of Onset    Colon cancer Father     Cancer Father         Colon at age 64    Colon cancer Brother     Cancer Brother         Colon age 57       Social History:   Social History     Socioeconomic History    Marital status:    Tobacco Use    Smoking status: Never    Smokeless tobacco: Never   Vaping Use    Vaping status: Never Used   Substance and Sexual Activity    Alcohol use: Never    Drug use: Never    Sexual activity: Yes     Partners: Female       Medications:     Current Outpatient Medications:     apixaban (ELIQUIS) 5 MG tablet tablet, Take 1 tablet by mouth 2 (Two) Times a Day. 2.5, Disp: , Rfl:     carbidopa-levodopa (SINEMET)  MG per tablet, TAKE 1 AND 1/2 TABLETS BY MOUTH THREE TIMES DAILY, Disp: , Rfl:     Allergies:   No Known Allergies      Physical Exam:  Vital Signs:   Vitals:    24 0903   BP: 132/84   BP Location: Left arm   Patient Position: Sitting  "  Cuff Size: Adult   Pulse: 101   Resp: 16   SpO2: 98%   Weight: 97.5 kg (214 lb 14.4 oz)   Height: 193 cm (75.98\")   PainSc: 0-No pain     Body mass index is 26.17 kg/m².     Physical Exam  Vitals and nursing note reviewed.   Constitutional:       Appearance: Normal appearance. He is normal weight.   HENT:      Head: Normocephalic and atraumatic.      Right Ear: Tympanic membrane, ear canal and external ear normal.      Left Ear: Tympanic membrane, ear canal and external ear normal.      Nose: Nose normal.      Mouth/Throat:      Mouth: Mucous membranes are dry.      Pharynx: Oropharynx is clear.   Eyes:      Extraocular Movements: Extraocular movements intact.      Conjunctiva/sclera: Conjunctivae normal.      Pupils: Pupils are equal, round, and reactive to light.   Cardiovascular:      Rate and Rhythm: Normal rate and regular rhythm.      Pulses: Normal pulses.      Heart sounds: Normal heart sounds.   Pulmonary:      Effort: Pulmonary effort is normal.      Breath sounds: Normal breath sounds.   Musculoskeletal:      Cervical back: Normal range of motion and neck supple.   Feet:      Comments:      Neurological:      Mental Status: He is alert.         Procedures      Assessment/Plan:   Diagnoses and all orders for this visit:    1. Hyperlipidemia, mixed (Primary)  Assessment & Plan:  HDL 55.  .  I talked to patient about starting a low-dose statin.  He is going to think about it.  We had a long discussion about endothelial dysfunction.    Orders:  -     Hemoglobin A1c; Future  -     Vitamin B12; Future  -     Vitamin D,25-Hydroxy; Future  -     Lipid Panel; Future  -     Comprehensive Metabolic Panel; Future  -     TSH Rfx On Abnormal To Free T4; Future  -     CBC & Differential; Future    2. Hyperglycemia  Assessment & Plan:  A1c 5.8.  We will follow.    Orders:  -     Hemoglobin A1c; Future  -     Vitamin B12; Future  -     Vitamin D,25-Hydroxy; Future  -     Lipid Panel; Future  -     Comprehensive " Metabolic Panel; Future  -     TSH Rfx On Abnormal To Free T4; Future  -     CBC & Differential; Future    3. Vitamin D deficiency  Assessment & Plan:  MND 5.8.  We will follow-up.    Orders:  -     Hemoglobin A1c; Future  -     Vitamin B12; Future  -     Vitamin D,25-Hydroxy; Future  -     Lipid Panel; Future  -     Comprehensive Metabolic Panel; Future  -     TSH Rfx On Abnormal To Free T4; Future  -     CBC & Differential; Future    4. Chronic kidney disease, stage 3a  Assessment & Plan:  Patient is instructed to not take any NSAIDs.  Medicines as directed.  Stay well-hydrated.      Orders:  -     Hemoglobin A1c; Future  -     Vitamin B12; Future  -     Vitamin D,25-Hydroxy; Future  -     Lipid Panel; Future  -     Comprehensive Metabolic Panel; Future  -     TSH Rfx On Abnormal To Free T4; Future  -     CBC & Differential; Future    5. Ventricular tachycardia  Assessment & Plan:  Patient had an episode in March of shortness of breath through the weekend.  He was seen in [place] and had a pulmonary embolism ruled out.  He saw cardiologist who did a stress test that was negative.  The cardiologist did a 2-week monitor and thought there was an 18 beat run of ventricular tachycardia.    Patient did not have a PE, and he did not have any structural heart disease.  I do not have a reason for his shortness of breath however some type of arrhythmia could explain this.  With his abnormal monitor I am going to have him see an electrophysiologist.  His regular cardiologist is in [place].  He has only seen him one time.  I will get him appoint with Dr. Chen.     Orders:  -     Ambulatory Referral to Cardiology  -     Hemoglobin A1c; Future  -     Vitamin B12; Future  -     Vitamin D,25-Hydroxy; Future  -     Lipid Panel; Future  -     Comprehensive Metabolic Panel; Future  -     TSH Rfx On Abnormal To Free T4; Future  -     CBC & Differential; Future             Follow Up:   Return in about 6 months (around 1/11/2025)  for Annual physical, Bloodwork 1 week prior to next appointment.      Cruzito Muñoz MD  [facility] [place] [facility]     Answers submitted by the patient for this visit:  Primary Reason for Visit (Submitted on 7/4/2024)  What is the primary reason for your visit?: Other  Other (Submitted on 7/4/2024)  Please describe your symptoms.: Follow up  Have you had these symptoms before?: Yes  How long have you been having these symptoms?: Greater than 2 weeks

## 2024-07-29 ENCOUNTER — OFFICE VISIT (OUTPATIENT)
Dept: FAMILY MEDICINE CLINIC | Facility: CLINIC | Age: 64
End: 2024-07-29
Payer: COMMERCIAL

## 2024-07-29 VITALS
SYSTOLIC BLOOD PRESSURE: 134 MMHG | HEIGHT: 76 IN | DIASTOLIC BLOOD PRESSURE: 84 MMHG | WEIGHT: 242 LBS | BODY MASS INDEX: 29.47 KG/M2 | RESPIRATION RATE: 15 BRPM

## 2024-07-29 DIAGNOSIS — M79.661 PAIN AND SWELLING OF RIGHT LOWER LEG: Primary | ICD-10-CM

## 2024-07-29 DIAGNOSIS — M79.89 PAIN AND SWELLING OF RIGHT LOWER LEG: Primary | ICD-10-CM

## 2024-07-29 PROCEDURE — 99213 OFFICE O/P EST LOW 20 MIN: CPT | Performed by: PHYSICIAN ASSISTANT

## 2024-07-29 NOTE — PROGRESS NOTES
"      Patient Office Visit      Patient Name: Benjie Wheat  : 1960   MRN: [id number]     Chief Complaint:    Chief Complaint   Patient presents with    Leg Pain       History of Present Illness: Benjie Wheat is a 63 y.o. male who is here today complaining of tender painful knot anterior right lower leg x 10 days.    Subjective      Review of Systems:         Past Medical History:   Past Medical History:   Diagnosis Date    Blood chemistry abnormality 2010    Disorder of bilirubin excretion 2010    Fracture     Kidney stones 12/10/2010    Mixed hyperlipidemia 2010    Parkinson's disease 2019    Pulmonary embolism 2023    Tremor 2019       Past Surgical History:   Past Surgical History:   Procedure Laterality Date    CHOLECYSTECTOMY      COLONOSCOPY      FRACTURE SURGERY      UPPER GASTROINTESTINAL ENDOSCOPY         Family History:   Family History   Problem Relation Age of Onset    Colon cancer Father     Cancer Father         Colon at age 64    Colon cancer Brother     Cancer Brother         Colon age 57       Social History:   Social History     Socioeconomic History    Marital status:    Tobacco Use    Smoking status: Never    Smokeless tobacco: Never   Vaping Use    Vaping status: Never Used   Substance and Sexual Activity    Alcohol use: Never    Drug use: Never    Sexual activity: Yes     Partners: Female       Allergies:   No Known Allergies    Objective     Physical Exam:  Vital Signs:   Vitals:    24 0838   BP: 134/84   BP Location: Right arm   Patient Position: Sitting   Cuff Size: Adult   Resp: 15   Weight: 110 kg (242 lb)   Height: 192.2 cm (75.68\")     Body mass index is 29.71 kg/m².           Physical Exam  Constitutional:       Appearance: Normal appearance.   Musculoskeletal:      Comments: 1 and half centimeter tender subcutaneous nodule anterior right lower mid leg.   Neurological:      Mental Status: He is alert. "         Procedures    Assessment / Plan      Assessment/Plan:   Diagnoses and all orders for this visit:    1. Pain and swelling of right lower leg (Primary)  -     XR Tibia Fibula 2 View Right; Future    Will get an x-ray to evaluate bony abnormality, has a follow-up appointment already with Dr. Muñoz August 14, if symptoms resolve may cancel otherwise keep appointment if x-ray normal and painful knot persists.    Medications:     Current Outpatient Medications:     apixaban (ELIQUIS) 5 MG tablet tablet, Take 1 tablet by mouth 2 (Two) Times a Day. 2.5, Disp: , Rfl:     carbidopa-levodopa (SINEMET)  MG per tablet, TAKE 1 AND 1/2 TABLETS BY MOUTH THREE TIMES DAILY, Disp: , Rfl:         Follow Up:   No follow-ups on file.    Kate Lowry PA-C   [facility] [place] [facility]     NOTE TO PATIENT: The 21st Century Cures Act makes medical notes like these available to patients in the interest of transparency. However, be advised this is a medical document. It is intended as peer to peer communication. It is written in medical language and may contain abbreviations or verbiage that are unfamiliar. It may appear blunt or direct. Medical documents are intended to carry relevant information, facts as evident, and the clinical opinion of the practitioner.   Answers submitted by the patient for this visit:  Primary Reason for Visit (Submitted on 7/27/2024)  What is the primary reason for your visit?: Other  Other (Submitted on 7/27/2024)  Please describe your symptoms.: Pain in right shin  Have you had these symptoms before?: No  How long have you been having these symptoms?: 1-2 weeks

## 2024-08-14 ENCOUNTER — OFFICE VISIT (OUTPATIENT)
Dept: FAMILY MEDICINE CLINIC | Facility: CLINIC | Age: 64
End: 2024-08-14
Payer: COMMERCIAL

## 2024-08-14 VITALS
HEART RATE: 100 BPM | BODY MASS INDEX: 29.51 KG/M2 | OXYGEN SATURATION: 97 % | HEIGHT: 76 IN | DIASTOLIC BLOOD PRESSURE: 84 MMHG | RESPIRATION RATE: 16 BRPM | WEIGHT: 242.3 LBS | SYSTOLIC BLOOD PRESSURE: 120 MMHG

## 2024-08-14 DIAGNOSIS — R22.41 LEG MASS, RIGHT: Primary | ICD-10-CM

## 2024-08-14 PROCEDURE — 99213 OFFICE O/P EST LOW 20 MIN: CPT | Performed by: FAMILY MEDICINE

## 2024-08-14 NOTE — PROGRESS NOTES
Patient Name: Benjie Wheat  : 1960   MRN: [id number]     Chief Complaint:    Chief Complaint   Patient presents with    Follow-up     RT Zimmerman       History of Present Illness: Benjie Wheat is a 63 y.o. male who is here today for follow up on leg pain.  Follow-up          Review of Systems:   Review of Systems   Constitutional: Negative.    HENT: Negative.     Eyes: Negative.    Respiratory: Negative.     Cardiovascular: Negative.    Gastrointestinal: Negative.    Musculoskeletal:         Pain in right shin   Neurological: Negative.         Past Medical History:   Past Medical History:   Diagnosis Date    Blood chemistry abnormality 2010    Disorder of bilirubin excretion 2010    Fracture     Kidney stones 12/10/2010    Mixed hyperlipidemia 2010    Parkinson's disease 2019    Pulmonary embolism 2023    Tremor 2019       Past Surgical History:   Past Surgical History:   Procedure Laterality Date    CHOLECYSTECTOMY      COLONOSCOPY      FRACTURE SURGERY      UPPER GASTROINTESTINAL ENDOSCOPY         Family History:   Family History   Problem Relation Age of Onset    Colon cancer Father     Cancer Father         Colon at age 64    Colon cancer Brother     Cancer Brother         Colon age 57       Social History:   Social History     Socioeconomic History    Marital status:    Tobacco Use    Smoking status: Never    Smokeless tobacco: Never   Vaping Use    Vaping status: Never Used   Substance and Sexual Activity    Alcohol use: Never    Drug use: Never    Sexual activity: Yes     Partners: Female       Medications:     Current Outpatient Medications:     apixaban (ELIQUIS) 5 MG tablet tablet, Take 1 tablet by mouth 2 (Two) Times a Day. 2.5, Disp: , Rfl:     carbidopa-levodopa (SINEMET)  MG per tablet, TAKE 1 AND 1/2 TABLETS BY MOUTH THREE TIMES DAILY, Disp: , Rfl:     Allergies:   No Known Allergies      Physical Exam:  Vital Signs:   Vitals:     "08/14/24 1052   BP: 120/84   BP Location: Left arm   Patient Position: Sitting   Cuff Size: Adult   Pulse: 100   Resp: 16   SpO2: 97%   Weight: 110 kg (242 lb 4.8 oz)   Height: 192.2 cm (75.67\")   PainSc:   2   PainLoc: Leg     Body mass index is 29.75 kg/m².     Physical Exam  Vitals and nursing note reviewed.   Constitutional:       Appearance: Normal appearance. He is normal weight.   HENT:      Head: Normocephalic and atraumatic.      Right Ear: Tympanic membrane, ear canal and external ear normal.      Left Ear: Tympanic membrane, ear canal and external ear normal.      Nose: Nose normal.      Mouth/Throat:      Mouth: Mucous membranes are dry.      Pharynx: Oropharynx is clear.   Eyes:      Extraocular Movements: Extraocular movements intact.      Conjunctiva/sclera: Conjunctivae normal.      Pupils: Pupils are equal, round, and reactive to light.   Cardiovascular:      Rate and Rhythm: Normal rate and regular rhythm.      Pulses: Normal pulses.      Heart sounds: Normal heart sounds.   Pulmonary:      Effort: Pulmonary effort is normal.      Breath sounds: Normal breath sounds.   Musculoskeletal:         General: Swelling present.      Cervical back: Normal range of motion and neck supple.      Comments: Mass on right shin   Feet:      Comments:      Neurological:      Mental Status: He is alert.         Procedures      Assessment/Plan:   Diagnoses and all orders for this visit:    1. Leg mass, right (Primary)  Assessment & Plan:  Patient has a small mass on his right shin that is tender.  No mechanism of injury.  He has had this over a month.  X-ray was negative.  Due to persistence of this abnormality and its tenderness I am going to get an MRI of his right leg.    Orders:  -     MRI Tibia Fibula Right Without Contrast; Future             Follow Up:   No follow-ups on file.      Cruzito Muñoz MD  [facility] [place] [facility]     Answers submitted by the patient for this visit:  Primary Reason for " Visit (Submitted on 8/13/2024)  What is the primary reason for your visit?: Other  Other (Submitted on 8/13/2024)  Please describe your symptoms.: Pain and lump on right shin  Have you had these symptoms before?: No  How long have you been having these symptoms?: Greater than 2 weeks

## 2024-08-14 NOTE — ASSESSMENT & PLAN NOTE
Patient has a small mass on his right shin that is tender.  No mechanism of injury.  He has had this over a month.  X-ray was negative.  Due to persistence of this abnormality and its tenderness I am going to get an MRI of his right leg.

## 2024-08-29 ENCOUNTER — TELEPHONE (OUTPATIENT)
Dept: FAMILY MEDICINE CLINIC | Facility: CLINIC | Age: 64
End: 2024-08-29

## 2024-08-31 ENCOUNTER — HOSPITAL ENCOUNTER (OUTPATIENT)
Dept: MRI IMAGING | Facility: HOSPITAL | Age: 64
Discharge: HOME | End: 2024-08-31
Admitting: FAMILY MEDICINE
Payer: COMMERCIAL

## 2024-08-31 DIAGNOSIS — R22.41 LEG MASS, RIGHT: ICD-10-CM

## 2024-08-31 PROCEDURE — 73718 MRI LOWER EXTREMITY W/O DYE: CPT

## 2024-09-10 ENCOUNTER — OFFICE VISIT (OUTPATIENT)
Dept: FAMILY MEDICINE CLINIC | Facility: CLINIC | Age: 64
End: 2024-09-10
Payer: COMMERCIAL

## 2024-09-10 VITALS
SYSTOLIC BLOOD PRESSURE: 132 MMHG | OXYGEN SATURATION: 98 % | DIASTOLIC BLOOD PRESSURE: 84 MMHG | HEART RATE: 65 BPM | WEIGHT: 245.8 LBS | BODY MASS INDEX: 29.93 KG/M2 | RESPIRATION RATE: 16 BRPM | HEIGHT: 76 IN

## 2024-09-10 DIAGNOSIS — E66.01 MORBID (SEVERE) OBESITY DUE TO EXCESS CALORIES: ICD-10-CM

## 2024-09-10 DIAGNOSIS — R73.9 HYPERGLYCEMIA: Primary | ICD-10-CM

## 2024-09-10 DIAGNOSIS — N18.31 CHRONIC KIDNEY DISEASE, STAGE 3A: ICD-10-CM

## 2024-09-10 DIAGNOSIS — E78.2 HYPERLIPIDEMIA, MIXED: ICD-10-CM

## 2024-09-10 DIAGNOSIS — I47.20 VENTRICULAR TACHYCARDIA: ICD-10-CM

## 2024-09-10 DIAGNOSIS — R22.41 LEG MASS, RIGHT: ICD-10-CM

## 2024-09-10 DIAGNOSIS — E53.8 VITAMIN B12 DEFICIENCY: ICD-10-CM

## 2024-09-10 DIAGNOSIS — E55.9 VITAMIN D DEFICIENCY: ICD-10-CM

## 2024-09-10 PROCEDURE — 99214 OFFICE O/P EST MOD 30 MIN: CPT | Performed by: FAMILY MEDICINE

## 2024-09-10 NOTE — ASSESSMENT & PLAN NOTE
Patient's (Body mass index is 30.18 kg/m².) indicates that they are obese (BMI >30) with health conditions that include dyslipidemias . Weight is worsening. BMI  is above average; BMI management plan is completed. We discussed portion control and increasing exercise.

## 2024-09-10 NOTE — PROGRESS NOTES
Patient Name: Benjie Wheat  : 1960   MRN: [id number]     Chief Complaint:    Chief Complaint   Patient presents with    Follow-up       History of Present Illness: Benjie Wheat is a 63 y.o. male who is here today for follow up.  Follow-up          Review of Systems:   Review of Systems   Constitutional: Negative.    HENT: Negative.     Eyes: Negative.    Respiratory: Negative.     Cardiovascular: Negative.    Gastrointestinal: Negative.    Neurological: Negative.         Past Medical History:   Past Medical History:   Diagnosis Date    Blood chemistry abnormality 2010    Disorder of bilirubin excretion 2010    Fracture     Kidney stones 12/10/2010    Mixed hyperlipidemia 2010    Parkinson's disease 2019    Pulmonary embolism 2023    Tremor 2019       Past Surgical History:   Past Surgical History:   Procedure Laterality Date    CHOLECYSTECTOMY      COLONOSCOPY      FRACTURE SURGERY      UPPER GASTROINTESTINAL ENDOSCOPY         Family History:   Family History   Problem Relation Age of Onset    Colon cancer Father     Cancer Father         Colon at age 64    Colon cancer Brother     Cancer Brother         Colon age 57       Social History:   Social History     Socioeconomic History    Marital status:    Tobacco Use    Smoking status: Never    Smokeless tobacco: Never   Vaping Use    Vaping status: Never Used   Substance and Sexual Activity    Alcohol use: Never    Drug use: Never    Sexual activity: Yes     Partners: Female       Medications:     Current Outpatient Medications:     apixaban (ELIQUIS) 5 MG tablet tablet, Take 1 tablet by mouth 2 (Two) Times a Day. 2.5, Disp: , Rfl:     carbidopa-levodopa (SINEMET)  MG per tablet, TAKE 1 AND 1/2 TABLETS BY MOUTH THREE TIMES DAILY, Disp: , Rfl:     Allergies:   No Known Allergies      Physical Exam:  Vital Signs:   Vitals:    09/10/24 1453   BP: 132/84   BP Location: Left arm   Patient Position:  "Sitting   Cuff Size: Adult   Pulse: 65   Resp: 16   SpO2: 98%   Weight: 111 kg (245 lb 12.8 oz)   Height: 192.2 cm (75.67\")     Body mass index is 30.18 kg/m².     Physical Exam  Vitals and nursing note reviewed.   Constitutional:       Appearance: Normal appearance. He is normal weight.   HENT:      Head: Normocephalic and atraumatic.      Right Ear: Tympanic membrane, ear canal and external ear normal.      Left Ear: Tympanic membrane, ear canal and external ear normal.      Nose: Nose normal.      Mouth/Throat:      Mouth: Mucous membranes are dry.      Pharynx: Oropharynx is clear.   Eyes:      Extraocular Movements: Extraocular movements intact.      Conjunctiva/sclera: Conjunctivae normal.      Pupils: Pupils are equal, round, and reactive to light.   Cardiovascular:      Rate and Rhythm: Normal rate and regular rhythm.      Pulses: Normal pulses.      Heart sounds: Normal heart sounds.   Pulmonary:      Effort: Pulmonary effort is normal.      Breath sounds: Normal breath sounds.   Musculoskeletal:      Cervical back: Normal range of motion and neck supple.   Feet:      Comments:      Neurological:      Mental Status: He is alert.         Procedures      Assessment/Plan:   Diagnoses and all orders for this visit:    1. Hyperglycemia (Primary)  Assessment & Plan:  Blood work 6 months      2. Vitamin B12 deficiency    3. Vitamin D deficiency    4. Chronic kidney disease, stage 3a  Assessment & Plan:  Patient is instructed to not take any NSAIDs.  Medicines as directed.  Stay well-hydrated.        5. Leg mass, right  Assessment & Plan:  Viewed MRI report.  Patient states pain is getting better.  Could be beginning of a stress fracture.  Televisit gets worse come back in.      6. Ventricular tachycardia  Assessment & Plan:  Patient sees electrophysiologist next week.  He had an 18 beat run of ventricular tachycardia.  He will let me know what electrophysiologist thinks      7. Hyperlipidemia, mixed  Assessment & " Plan:  HDL 55.  .  I talked to patient about starting a low-dose statin.  He is going to think about it.  We had a long discussion about endothelial dysfunction.      8. Morbid (severe) obesity due to excess calories  Assessment & Plan:  Patient's (Body mass index is 30.18 kg/m².) indicates that they are obese (BMI >30) with health conditions that include dyslipidemias . Weight is worsening. BMI  is above average; BMI management plan is completed. We discussed portion control and increasing exercise.                Follow Up:   No follow-ups on file.      Cruzito Muñoz MD  [facility] [place] [facility]     Answers submitted by the patient for this visit:  Primary Reason for Visit (Submitted on 9/8/2024)  What is the primary reason for your visit?: Other  Other (Submitted on 9/8/2024)  Please describe your symptoms.: Follow up after MRI of right shin for lump and pain  Have you had these symptoms before?: Yes  How long have you been having these symptoms?: Greater than 2 weeks

## 2024-09-10 NOTE — ASSESSMENT & PLAN NOTE
Patient sees electrophysiologist next week.  He had an 18 beat run of ventricular tachycardia.  He will let me know what electrophysiologist thinks

## 2024-09-10 NOTE — ASSESSMENT & PLAN NOTE
Viewed MRI report.  Patient states pain is getting better.  Could be beginning of a stress fracture.  Televisit gets worse come back in.

## 2024-09-19 ENCOUNTER — OFFICE VISIT (OUTPATIENT)
Dept: CARDIOLOGY | Facility: CLINIC | Age: 64
End: 2024-09-19
Payer: COMMERCIAL

## 2024-09-19 VITALS
OXYGEN SATURATION: 97 % | WEIGHT: 244 LBS | HEIGHT: 76 IN | HEART RATE: 91 BPM | SYSTOLIC BLOOD PRESSURE: 126 MMHG | DIASTOLIC BLOOD PRESSURE: 90 MMHG | BODY MASS INDEX: 29.71 KG/M2

## 2024-09-19 DIAGNOSIS — I47.20 VENTRICULAR TACHYCARDIA: Primary | Chronic | ICD-10-CM

## 2024-09-19 DIAGNOSIS — R00.2 PALPITATIONS: Chronic | ICD-10-CM

## 2024-11-22 NOTE — ASSESSMENT & PLAN NOTE
Blood work   Result Comment  Vince Hawk,    Your hemoglobin was normal. Your creatinine was slightly elevated at 1.45. I would like you to increase your fluid intake, avoiding caffeine and alcohol and recheck this in one week. This will not necessarily impact your ability to have surgery as long as it does not continue to elevate. I will have staff reach out to you to get lab scheduled for one week.     Please let us know if you have any questions or concerns.     Regards,  Lula Ansari NP

## 2025-01-03 ENCOUNTER — LAB (OUTPATIENT)
Dept: FAMILY MEDICINE CLINIC | Facility: CLINIC | Age: 65
End: 2025-01-03
Payer: COMMERCIAL

## 2025-01-03 DIAGNOSIS — N18.31 CHRONIC KIDNEY DISEASE, STAGE 3A: ICD-10-CM

## 2025-01-03 DIAGNOSIS — E78.2 HYPERLIPIDEMIA, MIXED: ICD-10-CM

## 2025-01-03 DIAGNOSIS — I47.20 VENTRICULAR TACHYCARDIA: ICD-10-CM

## 2025-01-03 DIAGNOSIS — E55.9 VITAMIN D DEFICIENCY: ICD-10-CM

## 2025-01-03 DIAGNOSIS — R73.9 HYPERGLYCEMIA: ICD-10-CM

## 2025-01-06 LAB
25(OH)D3+25(OH)D2 SERPL-MCNC: 35.2 NG/ML (ref 30–100)
ALBUMIN SERPL-MCNC: 4.2 G/DL (ref 3.9–4.9)
ALP SERPL-CCNC: 60 IU/L (ref 44–121)
ALT SERPL-CCNC: 15 IU/L (ref 0–44)
AST SERPL-CCNC: 14 IU/L (ref 0–40)
BASOPHILS # BLD AUTO: 0.1 X10E3/UL (ref 0–0.2)
BASOPHILS NFR BLD AUTO: 1 %
BILIRUB SERPL-MCNC: 0.9 MG/DL (ref 0–1.2)
BUN SERPL-MCNC: 16 MG/DL (ref 8–27)
BUN/CREAT SERPL: 11 (ref 10–24)
CALCIUM SERPL-MCNC: 9.2 MG/DL (ref 8.6–10.2)
CHLORIDE SERPL-SCNC: 108 MMOL/L (ref 96–106)
CHOLEST SERPL-MCNC: 217 MG/DL (ref 100–199)
CO2 SERPL-SCNC: 23 MMOL/L (ref 20–29)
CREAT SERPL-MCNC: 1.42 MG/DL (ref 0.76–1.27)
EGFRCR SERPLBLD CKD-EPI 2021: 55 ML/MIN/1.73
EOSINOPHIL # BLD AUTO: 0.1 X10E3/UL (ref 0–0.4)
EOSINOPHIL NFR BLD AUTO: 2 %
ERYTHROCYTE [DISTWIDTH] IN BLOOD BY AUTOMATED COUNT: 13 % (ref 11.6–15.4)
GLOBULIN SER CALC-MCNC: 2.7 G/DL (ref 1.5–4.5)
GLUCOSE SERPL-MCNC: 97 MG/DL (ref 70–99)
HBA1C MFR BLD: 5.6 % (ref 4.8–5.6)
HCT VFR BLD AUTO: 52.9 % (ref 37.5–51)
HDLC SERPL-MCNC: 61 MG/DL
HGB BLD-MCNC: 17 G/DL (ref 13–17.7)
IMM GRANULOCYTES # BLD AUTO: 0.1 X10E3/UL (ref 0–0.1)
IMM GRANULOCYTES NFR BLD AUTO: 1 %
LDLC SERPL CALC-MCNC: 135 MG/DL (ref 0–99)
LYMPHOCYTES # BLD AUTO: 1.8 X10E3/UL (ref 0.7–3.1)
LYMPHOCYTES NFR BLD AUTO: 30 %
MCH RBC QN AUTO: 29.3 PG (ref 26.6–33)
MCHC RBC AUTO-ENTMCNC: 32.1 G/DL (ref 31.5–35.7)
MCV RBC AUTO: 91 FL (ref 79–97)
MONOCYTES # BLD AUTO: 0.7 X10E3/UL (ref 0.1–0.9)
MONOCYTES NFR BLD AUTO: 11 %
NEUTROPHILS # BLD AUTO: 3.3 X10E3/UL (ref 1.4–7)
NEUTROPHILS NFR BLD AUTO: 55 %
PLATELET # BLD AUTO: 381 X10E3/UL (ref 150–450)
POTASSIUM SERPL-SCNC: 4.6 MMOL/L (ref 3.5–5.2)
PROT SERPL-MCNC: 6.9 G/DL (ref 6–8.5)
RBC # BLD AUTO: 5.8 X10E6/UL (ref 4.14–5.8)
SODIUM SERPL-SCNC: 144 MMOL/L (ref 134–144)
TRIGL SERPL-MCNC: 120 MG/DL (ref 0–149)
TSH SERPL DL<=0.005 MIU/L-ACNC: 2.82 UIU/ML (ref 0.45–4.5)
VIT B12 SERPL-MCNC: 339 PG/ML (ref 232–1245)
VLDLC SERPL CALC-MCNC: 21 MG/DL (ref 5–40)
WBC # BLD AUTO: 6 X10E3/UL (ref 3.4–10.8)

## 2025-01-28 NOTE — PROGRESS NOTES
Patient Name: Benjie Wheat  : 1960   MRN: [id number]     Chief Complaint:    Chief Complaint   Patient presents with    Follow up Mri of [facility]    Follow up Cardiology        History of Present Illness: Benjie Wheat is a 64 y.o. male who is here today for follow up on blood glucose, cholesterol, vitamin D, and blood sugar.  HPI        Review of Systems:   Review of Systems   Constitutional: Negative.  Negative for chills, diaphoresis, fatigue and fever.   HENT: Negative.  Negative for congestion and sore throat.    Eyes: Negative.    Respiratory: Negative.  Negative for cough.    Cardiovascular: Negative.  Negative for chest pain.   Gastrointestinal: Negative.  Negative for abdominal pain, nausea and vomiting.   Genitourinary:  Negative for dysuria.   Musculoskeletal:  Negative for myalgias and neck pain.   Skin:  Negative for rash.   Neurological: Negative.  Negative for weakness, numbness and headaches.        Past Medical History:   Past Medical History:   Diagnosis Date    Blood chemistry abnormality 2010    Disorder of bilirubin excretion 2010    Fracture     Kidney stones 12/10/2010    Mixed hyperlipidemia 2010    Parkinson's disease 2019    Pulmonary embolism 2023    Tremor 2019       Past Surgical History:   Past Surgical History:   Procedure Laterality Date    CHOLECYSTECTOMY      COLONOSCOPY      FRACTURE SURGERY      UPPER GASTROINTESTINAL ENDOSCOPY         Family History:   Family History   Problem Relation Age of Onset    Colon cancer Father     Cancer Father         Colon at age 64    Colon cancer Brother     Cancer Brother         Colon age 57    Hypertension Mother        Social History:   Social History     Socioeconomic History    Marital status:    Tobacco Use    Smoking status: Never     Passive exposure: Never    Smokeless tobacco: Never   Vaping Use    Vaping status: Never Used   Substance and Sexual Activity    Alcohol use:  "Never    Drug use: Never    Sexual activity: Yes     Partners: Female     Birth control/protection: None       Medications:     Current Outpatient Medications:     apixaban (ELIQUIS) 5 MG tablet tablet, Take 0.5 tablets by mouth 2 (Two) Times a Day. 2.5, Disp: , Rfl:     Allergies:   No Known Allergies      Physical Exam:  Vital Signs:   Vitals:    01/29/25 0956   BP: 118/92   BP Location: Left arm   Patient Position: Sitting   Cuff Size: Adult   Pulse: 92   Resp: 16   SpO2: 97%   Weight: 115 kg (253 lb 3.2 oz)   Height: 193 cm (75.98\")     Body mass index is 30.83 kg/m².     Physical Exam  Vitals and nursing note reviewed.   Constitutional:       Appearance: Normal appearance. He is normal weight.   HENT:      Head: Normocephalic and atraumatic.      Right Ear: Tympanic membrane, ear canal and external ear normal.      Left Ear: Tympanic membrane, ear canal and external ear normal.      Nose: Nose normal.      Mouth/Throat:      Mouth: Mucous membranes are dry.      Pharynx: Oropharynx is clear.   Eyes:      Extraocular Movements: Extraocular movements intact.      Conjunctiva/sclera: Conjunctivae normal.      Pupils: Pupils are equal, round, and reactive to light.   Cardiovascular:      Rate and Rhythm: Normal rate and regular rhythm.      Pulses: Normal pulses.      Heart sounds: Normal heart sounds.   Pulmonary:      Effort: Pulmonary effort is normal.      Breath sounds: Normal breath sounds.   Musculoskeletal:      Cervical back: Normal range of motion and neck supple.   Feet:      Comments:      Neurological:      Mental Status: He is alert.         Procedures      Assessment/Plan:   Diagnoses and all orders for this visit:    1. Immunization due (Primary)  -     Fluzone >6mos  -     Comprehensive Metabolic Panel; Future  -     Lipid Panel; Future  -     CBC & Differential; Future  -     Hemoglobin A1c; Future  -     PSA Screen; Future  -     Vitamin B12; Future  -     Vitamin D,25-Hydroxy; Future  -     TSH " Rfx On Abnormal To Free T4; Future    2. Hyperglycemia  Assessment & Plan:  A1c is 5.6.  This is improved.  We will follow-up.    Orders:  -     Comprehensive Metabolic Panel; Future  -     Lipid Panel; Future  -     CBC & Differential; Future  -     Hemoglobin A1c; Future  -     PSA Screen; Future  -     Vitamin B12; Future  -     Vitamin D,25-Hydroxy; Future  -     TSH Rfx On Abnormal To Free T4; Future    3. Ventricular tachycardia  Assessment & Plan:  Patient has been following with electrophysiologist.  Had elevated heart rate in the past.  He will follow-up with electrophysiologist as to whether he needs a beta-blocker or not.    Orders:  -     Comprehensive Metabolic Panel; Future  -     Lipid Panel; Future  -     CBC & Differential; Future  -     Hemoglobin A1c; Future  -     PSA Screen; Future  -     Vitamin B12; Future  -     Vitamin D,25-Hydroxy; Future  -     TSH Rfx On Abnormal To Free T4; Future    4. Pulmonary embolism without acute cor pulmonale, unspecified chronicity, unspecified pulmonary embolism type  Assessment & Plan:  Patient has been taking Eliquis and follows with hematologist.    Orders:  -     Comprehensive Metabolic Panel; Future  -     Lipid Panel; Future  -     CBC & Differential; Future  -     Hemoglobin A1c; Future  -     PSA Screen; Future  -     Vitamin B12; Future  -     Vitamin D,25-Hydroxy; Future  -     TSH Rfx On Abnormal To Free T4; Future    5. Vitamin B12 deficiency  Assessment & Plan:  B12 is 339.  Recheck in 6 months.    Orders:  -     Comprehensive Metabolic Panel; Future  -     Lipid Panel; Future  -     CBC & Differential; Future  -     Hemoglobin A1c; Future  -     PSA Screen; Future  -     Vitamin B12; Future  -     Vitamin D,25-Hydroxy; Future  -     TSH Rfx On Abnormal To Free T4; Future    6. Vitamin D deficiency  Assessment & Plan:  Vitamin D is 35.2.  We will follow.    Orders:  -     Comprehensive Metabolic Panel; Future  -     Lipid Panel; Future  -     CBC &  Differential; Future  -     Hemoglobin A1c; Future  -     PSA Screen; Future  -     Vitamin B12; Future  -     Vitamin D,25-Hydroxy; Future  -     TSH Rfx On Abnormal To Free T4; Future    7. Chronic kidney disease, stage 3a  Assessment & Plan:  GFR is 55.Patient is instructed to not take any NSAIDs.  Medicines as directed.  Stay well-hydrated.      Orders:  -     Comprehensive Metabolic Panel; Future  -     Lipid Panel; Future  -     CBC & Differential; Future  -     Hemoglobin A1c; Future  -     PSA Screen; Future  -     Vitamin B12; Future  -     Vitamin D,25-Hydroxy; Future  -     TSH Rfx On Abnormal To Free T4; Future    8. Dystonic tremor  Assessment & Plan:  Follows with neurology.  Patient is off his carbidopa levodopa.    Orders:  -     Comprehensive Metabolic Panel; Future  -     Lipid Panel; Future  -     CBC & Differential; Future  -     Hemoglobin A1c; Future  -     PSA Screen; Future  -     Vitamin B12; Future  -     Vitamin D,25-Hydroxy; Future  -     TSH Rfx On Abnormal To Free T4; Future    9. Leg mass, right  Assessment & Plan:  Will follow    Orders:  -     Comprehensive Metabolic Panel; Future  -     Lipid Panel; Future  -     CBC & Differential; Future  -     Hemoglobin A1c; Future  -     PSA Screen; Future  -     Vitamin B12; Future  -     Vitamin D,25-Hydroxy; Future  -     TSH Rfx On Abnormal To Free T4; Future    10. Screening for prostate cancer  -     PSA Screen; Future             Follow Up:   Return in about 6 months (around 7/29/2025) for Annual physical, Bloodwork 1 week prior to next appointment.      Cruzito Muñoz MD  [facility] [place] [facility]

## 2025-01-28 NOTE — ASSESSMENT & PLAN NOTE
Patient has been following with electrophysiologist.  Had elevated heart rate in the past.  He will follow-up with electrophysiologist as to whether he needs a beta-blocker or not.

## 2025-01-28 NOTE — ASSESSMENT & PLAN NOTE
GFR is 55.Patient is instructed to not take any NSAIDs.  Medicines as directed.  Stay well-hydrated.

## 2025-01-29 ENCOUNTER — OFFICE VISIT (OUTPATIENT)
Dept: FAMILY MEDICINE CLINIC | Facility: CLINIC | Age: 65
End: 2025-01-29
Payer: COMMERCIAL

## 2025-01-29 VITALS
HEIGHT: 76 IN | WEIGHT: 253.2 LBS | SYSTOLIC BLOOD PRESSURE: 118 MMHG | HEART RATE: 92 BPM | BODY MASS INDEX: 30.83 KG/M2 | OXYGEN SATURATION: 97 % | DIASTOLIC BLOOD PRESSURE: 92 MMHG | RESPIRATION RATE: 16 BRPM

## 2025-01-29 DIAGNOSIS — R22.41 LEG MASS, RIGHT: ICD-10-CM

## 2025-01-29 DIAGNOSIS — Z12.5 SCREENING FOR PROSTATE CANCER: ICD-10-CM

## 2025-01-29 DIAGNOSIS — N18.31 CHRONIC KIDNEY DISEASE, STAGE 3A: ICD-10-CM

## 2025-01-29 DIAGNOSIS — G25.2 DYSTONIC TREMOR: ICD-10-CM

## 2025-01-29 DIAGNOSIS — R73.9 HYPERGLYCEMIA: ICD-10-CM

## 2025-01-29 DIAGNOSIS — I26.99 PULMONARY EMBOLISM WITHOUT ACUTE COR PULMONALE, UNSPECIFIED CHRONICITY, UNSPECIFIED PULMONARY EMBOLISM TYPE: ICD-10-CM

## 2025-01-29 DIAGNOSIS — I47.20 VENTRICULAR TACHYCARDIA: ICD-10-CM

## 2025-01-29 DIAGNOSIS — Z23 IMMUNIZATION DUE: Primary | ICD-10-CM

## 2025-01-29 DIAGNOSIS — E53.8 VITAMIN B12 DEFICIENCY: ICD-10-CM

## 2025-01-29 DIAGNOSIS — E55.9 VITAMIN D DEFICIENCY: ICD-10-CM

## 2025-01-29 PROCEDURE — 99214 OFFICE O/P EST MOD 30 MIN: CPT | Performed by: FAMILY MEDICINE

## 2025-01-29 PROCEDURE — 90471 IMMUNIZATION ADMIN: CPT | Performed by: FAMILY MEDICINE

## 2025-01-29 PROCEDURE — 90656 IIV3 VACC NO PRSV 0.5 ML IM: CPT | Performed by: FAMILY MEDICINE

## 2025-03-20 ENCOUNTER — OFFICE VISIT (OUTPATIENT)
Dept: CARDIOLOGY | Facility: CLINIC | Age: 65
End: 2025-03-20
Payer: COMMERCIAL

## 2025-03-20 ENCOUNTER — PATIENT ROUNDING (BHMG ONLY) (OUTPATIENT)
Dept: CARDIOLOGY | Facility: CLINIC | Age: 65
End: 2025-03-20
Payer: COMMERCIAL

## 2025-03-20 VITALS
HEIGHT: 76 IN | BODY MASS INDEX: 31.15 KG/M2 | HEART RATE: 100 BPM | SYSTOLIC BLOOD PRESSURE: 118 MMHG | OXYGEN SATURATION: 94 % | WEIGHT: 255.8 LBS | DIASTOLIC BLOOD PRESSURE: 80 MMHG

## 2025-03-20 DIAGNOSIS — I47.29 NSVT (NONSUSTAINED VENTRICULAR TACHYCARDIA): Primary | Chronic | ICD-10-CM

## 2025-03-20 NOTE — PROGRESS NOTES
March 20, 2025    Hello, may I speak with Benjie Wheat?    My name is ALEXANDRA.       I am  with [facility] CARDIOLOGY  [address]  [place] [address] [phone].    Before we get started may I verify your date of birth? 1960    I am calling to officially welcome you to our practice and ask about your recent visit. Is this a good time to talk? yes    Tell me about your visit with us. What things went well?  EVERYTHING       We're always looking for ways to make our patients' experiences even better. Do you have recommendations on ways we may improve?  no    Overall were you satisfied with your first visit to our practice? yes       I appreciate you taking the time to speak with me today. Is there anything else I can do for you? no      Thank you, and have a great day.

## 2025-03-20 NOTE — PROGRESS NOTES
"Benjie Wheat  [id number]  1960  [phone]      [facility] CARDIOLOGY     Referring Provider: No ref. provider found     Cruzito Muñoz MD  [address] [address]    Chief Complaint   Patient presents with    Palpitations       Problem List  NSVT  18 beats on UMA in setting of multiple stressors  TTE: normal cardiac function and structure  CCTA: normal coronaries    PE  Hyperlipidemia  CKD stage III        History of Present Illness   Benjie Wheat is a 64 y.o. male who presents to my electrophysiology clinic for follow up of NSVT. Since the last time we saw the patient, he has done well from a cardiac standpoint.  He occasionally has palpitations that are quick in nature.  These are less frequent than the last time he saw us in clinic.  Currently on Eliquis 2.5 mg for history PEs.         Outpatient Medications Marked as Taking for the 3/20/25 encounter (Office Visit) with Gregory Chen MD   Medication Sig Dispense Refill    apixaban (ELIQUIS) 5 MG tablet tablet Take 0.5 tablets by mouth 2 (Two) Times a Day. 2.5              Physical Exam  Vitals:    03/20/25 1017   BP: 118/80   BP Location: Left arm   Patient Position: Sitting   Cuff Size: Adult   Pulse: 100   SpO2: 94%   Weight: 116 kg (255 lb 12.8 oz)   Height: 193 cm (76\")     Body mass index is 31.14 kg/m².  Constitutional:       Appearance: Healthy appearance.   Neck:      Vascular: JVD normal.   Pulmonary:      Effort: Pulmonary effort is normal.      Breath sounds: Normal breath sounds.   Cardiovascular:      Normal rate. Regular rhythm. Normal S1. Normal S2.       Murmurs: There is no murmur.      No gallop.  No rub.   Pulses:     Intact distal pulses.   Edema:     Peripheral edema absent.   Neurological:      General: No focal deficit present.          Diagnostic Data  Procedures    Lab Results   Component Value Date    GLUCOSE 97 01/03/2025    CALCIUM 9.2 01/03/2025     01/03/2025    K 4.6 " 01/03/2025    CO2 23 01/03/2025     (H) 01/03/2025    BUN 16 01/03/2025    CREATININE 1.42 (H) 01/03/2025    BCR 11 01/03/2025     Lab Results   Component Value Date    WBC 6.0 01/03/2025    HGB 17.0 01/03/2025    HCT 52.9 (H) 01/03/2025    MCV 91 01/03/2025     01/03/2025     Lab Results   Component Value Date    INR 1.1 03/05/2024    PROTIME 12.3 03/05/2024     Lab Results   Component Value Date    TSH 2.820 01/03/2025    I7GUHCG 8.0 07/15/2023       I personally viewed and interpreted the patient's EKG/Telemetry/lab data    Benjie Wheat  reports that he has never smoked. He has never been exposed to tobacco smoke. He has never used smokeless tobacco. I have educated him on the risk of diseases from using tobacco products such as cancer, COPD, and heart disease.           Assessment and Plan  Diagnoses and all orders for this visit:    1. NSVT (nonsustained ventricular tachycardia) (Primary)        Nonsustained Ventricular tachycardia  - CCTA and TTE wnl  - given his normal cardiac structure and function, likely triggered WCT, suspecting autonomic NSVT  - symptoms improving from previous clinic appt and continues to defer BB  - will follow up in 1 year.    Follow-Up  Return in about 1 year (around 3/20/2026).      Thank you for allowing me to participate in the care of your patient. Please to not hesitate to contact me with additional questions or concerns.

## 2025-06-25 NOTE — PROGRESS NOTES
Patient Name: Benjie Wheat  : 1960   MRN: [id number]     Chief Complaint:    Chief Complaint   Patient presents with    Leg mass       History of Present Illness: Benjie Wheat is a 64 y.o. male who is here today for follow up on V. tach, PE, vitamin B12 and D deficiency, CKD and tremor.  HPI        Review of Systems:   Review of Systems   Constitutional: Negative.    HENT: Negative.     Eyes: Negative.    Respiratory: Negative.     Cardiovascular: Negative.    Gastrointestinal: Negative.    Neurological: Negative.  Negative for numbness.        Past Medical History:   Past Medical History:   Diagnosis Date    Blood chemistry abnormality 2010    Disorder of bilirubin excretion 2010    Fracture     Kidney stones 12/10/2010    Mixed hyperlipidemia 2010    Parkinson's disease 2019    Pulmonary embolism 2023    Tremor 2019       Past Surgical History:   Past Surgical History:   Procedure Laterality Date    CHOLECYSTECTOMY      COLONOSCOPY      FRACTURE SURGERY      UPPER GASTROINTESTINAL ENDOSCOPY         Family History:   Family History   Problem Relation Age of Onset    Colon cancer Father     Cancer Father         Colon at age 64    Colon cancer Brother     Cancer Brother         Colon age 57    Hypertension Mother     Stroke Mother     Cancer Brother         Colon age 57       Social History:   Social History     Socioeconomic History    Marital status:    Tobacco Use    Smoking status: Never     Passive exposure: Never    Smokeless tobacco: Never   Vaping Use    Vaping status: Never Used   Substance and Sexual Activity    Alcohol use: Never    Drug use: Never    Sexual activity: Yes     Partners: Female     Birth control/protection: None       Medications:     Current Outpatient Medications:     apixaban (ELIQUIS) 5 MG tablet tablet, Take 0.5 tablets by mouth 2 (Two) Times a Day. 2.5, Disp: , Rfl:     Allergies:   No Known Allergies      Physical  "Exam:  Vital Signs:   Vitals:    06/26/25 1455   BP: 118/70   BP Location: Left arm   Patient Position: Sitting   Cuff Size: Large Adult   Pulse: 91   SpO2: 98%   Weight: 115 kg (253 lb 12.8 oz)   Height: 193 cm (75.98\")   PainSc: 8    PainLoc: Leg     Body mass index is 30.91 kg/m².     Physical Exam  Vitals and nursing note reviewed.   Constitutional:       Appearance: Normal appearance. He is normal weight.   HENT:      Head: Normocephalic and atraumatic.      Right Ear: Tympanic membrane, ear canal and external ear normal.      Left Ear: Tympanic membrane, ear canal and external ear normal.      Nose: Nose normal.      Mouth/Throat:      Mouth: Mucous membranes are dry.      Pharynx: Oropharynx is clear.   Eyes:      Extraocular Movements: Extraocular movements intact.      Conjunctiva/sclera: Conjunctivae normal.      Pupils: Pupils are equal, round, and reactive to light.   Cardiovascular:      Rate and Rhythm: Normal rate and regular rhythm.      Pulses: Normal pulses.      Heart sounds: Normal heart sounds.   Pulmonary:      Effort: Pulmonary effort is normal.      Breath sounds: Normal breath sounds.   Musculoskeletal:      Cervical back: Normal range of motion and neck supple.   Feet:      Comments:      Neurological:      Mental Status: He is alert.         Procedures      Assessment/Plan:   Diagnoses and all orders for this visit:    1. Leg mass, right (Primary)  Assessment & Plan:  Will get an x-ray of right shin.  Patient had an x-ray and MRI last year.  This got better with icing.  I will have him ice and as long as x-ray looks good have him manage his golf based on how he feels.    Orders:  -     XR Tibia Fibula 2 View Right (In Office)    2. Hyperglycemia  Assessment & Plan:  Blood work      3. Vitamin B12 deficiency  Assessment & Plan:  Blood work      4. Vitamin D deficiency  Assessment & Plan:  Blood work      5. NSVT (nonsustained ventricular tachycardia)  Assessment & Plan:  Patient has " followed up with electrophysiologist.      6. Pulmonary embolism without acute cor pulmonale, unspecified chronicity, unspecified pulmonary embolism type  Assessment & Plan:  Patient has been taking Eliquis and follows with hematologist.      7. Chronic kidney disease, stage 3a  Assessment & Plan:  Patient is instructed to not take any NSAIDs.  Medicines as directed.  Stay well-hydrated.        8. Dystonic tremor  Assessment & Plan:  Follows with neurology.  Patient is off his carbidopa levodopa.               Follow Up:   No follow-ups on file.      Cruzito Muñoz MD  [facility]

## 2025-06-25 NOTE — ASSESSMENT & PLAN NOTE
Will get an x-ray of right shin.  Patient had an x-ray and MRI last year.  This got better with icing.  I will have him ice and as long as x-ray looks good have him manage his golf based on how he feels.

## 2025-06-26 ENCOUNTER — OFFICE VISIT (OUTPATIENT)
Dept: FAMILY MEDICINE CLINIC | Facility: CLINIC | Age: 65
End: 2025-06-26
Payer: COMMERCIAL

## 2025-06-26 VITALS
BODY MASS INDEX: 30.91 KG/M2 | OXYGEN SATURATION: 98 % | SYSTOLIC BLOOD PRESSURE: 118 MMHG | HEIGHT: 76 IN | WEIGHT: 253.8 LBS | HEART RATE: 91 BPM | DIASTOLIC BLOOD PRESSURE: 70 MMHG

## 2025-06-26 DIAGNOSIS — I47.29 NSVT (NONSUSTAINED VENTRICULAR TACHYCARDIA): ICD-10-CM

## 2025-06-26 DIAGNOSIS — N18.31 CHRONIC KIDNEY DISEASE, STAGE 3A: ICD-10-CM

## 2025-06-26 DIAGNOSIS — R22.41 LEG MASS, RIGHT: Primary | ICD-10-CM

## 2025-06-26 DIAGNOSIS — R73.9 HYPERGLYCEMIA: ICD-10-CM

## 2025-06-26 DIAGNOSIS — I26.99 PULMONARY EMBOLISM WITHOUT ACUTE COR PULMONALE, UNSPECIFIED CHRONICITY, UNSPECIFIED PULMONARY EMBOLISM TYPE: ICD-10-CM

## 2025-06-26 DIAGNOSIS — E53.8 VITAMIN B12 DEFICIENCY: ICD-10-CM

## 2025-06-26 DIAGNOSIS — G25.2 DYSTONIC TREMOR: ICD-10-CM

## 2025-06-26 DIAGNOSIS — E55.9 VITAMIN D DEFICIENCY: ICD-10-CM

## 2025-06-27 ENCOUNTER — RESULTS FOLLOW-UP (OUTPATIENT)
Dept: FAMILY MEDICINE CLINIC | Facility: CLINIC | Age: 65
End: 2025-06-27
Payer: COMMERCIAL

## 2025-08-04 PROBLEM — R91.8 LUNG NODULES: Status: ACTIVE | Noted: 2025-08-04

## 2025-08-05 ENCOUNTER — OFFICE VISIT (OUTPATIENT)
Dept: FAMILY MEDICINE CLINIC | Facility: CLINIC | Age: 65
End: 2025-08-05
Payer: COMMERCIAL

## 2025-08-05 VITALS
OXYGEN SATURATION: 94 % | HEART RATE: 91 BPM | WEIGHT: 255.5 LBS | BODY MASS INDEX: 31.11 KG/M2 | RESPIRATION RATE: 16 BRPM | SYSTOLIC BLOOD PRESSURE: 120 MMHG | DIASTOLIC BLOOD PRESSURE: 82 MMHG | HEIGHT: 76 IN

## 2025-08-05 DIAGNOSIS — R91.8 LUNG NODULES: ICD-10-CM

## 2025-08-05 DIAGNOSIS — R73.9 HYPERGLYCEMIA: ICD-10-CM

## 2025-08-05 DIAGNOSIS — Z00.00 PHYSICAL EXAM: Primary | ICD-10-CM

## 2025-08-05 DIAGNOSIS — E55.9 VITAMIN D DEFICIENCY: ICD-10-CM

## 2025-08-05 DIAGNOSIS — E78.2 HYPERLIPIDEMIA, MIXED: ICD-10-CM

## 2025-08-05 DIAGNOSIS — E53.8 VITAMIN B12 DEFICIENCY: ICD-10-CM

## 2025-08-05 DIAGNOSIS — N18.31 CHRONIC KIDNEY DISEASE, STAGE 3A: ICD-10-CM

## 2025-08-05 DIAGNOSIS — G25.2 DYSTONIC TREMOR: ICD-10-CM

## 2025-08-05 DIAGNOSIS — I47.29 NSVT (NONSUSTAINED VENTRICULAR TACHYCARDIA): ICD-10-CM

## 2025-08-05 PROCEDURE — 99396 PREV VISIT EST AGE 40-64: CPT | Performed by: FAMILY MEDICINE

## 2025-08-05 PROCEDURE — 99214 OFFICE O/P EST MOD 30 MIN: CPT | Performed by: FAMILY MEDICINE

## 2025-08-05 RX ORDER — ROSUVASTATIN CALCIUM 10 MG/1
10 TABLET, COATED ORAL DAILY
Qty: 90 TABLET | Refills: 1 | Status: SHIPPED | OUTPATIENT
Start: 2025-08-05

## 2025-08-14 ENCOUNTER — OFFICE VISIT (OUTPATIENT)
Dept: FAMILY MEDICINE CLINIC | Facility: CLINIC | Age: 65
End: 2025-08-14
Payer: COMMERCIAL

## 2025-08-14 VITALS
BODY MASS INDEX: 30.78 KG/M2 | SYSTOLIC BLOOD PRESSURE: 102 MMHG | WEIGHT: 252.8 LBS | HEIGHT: 76 IN | HEART RATE: 107 BPM | OXYGEN SATURATION: 98 % | DIASTOLIC BLOOD PRESSURE: 70 MMHG

## 2025-08-14 DIAGNOSIS — N20.0 KIDNEY STONE: Primary | ICD-10-CM

## 2025-08-14 LAB
BUPRENORPHINE SERPL-MCNC: NEGATIVE NG/ML
MDMA SERPLBLD-MCNC: NEGATIVE NG/ML
PCP: NEGATIVE
POC AMPHETAMINES: NEGATIVE
POC BARBITURATES: NEGATIVE
POC BENZODIAZEPHINES: NEGATIVE
POC COCAINE: NEGATIVE
POC METHADONE: NEGATIVE
POC METHAMPHETAMINE SCREEN URINE: NEGATIVE
POC OPIATES: NEGATIVE
POC OXYCODONE: POSITIVE
POC THC: NEGATIVE

## 2025-08-14 RX ORDER — OXYCODONE AND ACETAMINOPHEN 5; 325 MG/1; MG/1
1 TABLET ORAL
COMMUNITY
Start: 2025-08-11 | End: 2025-08-14 | Stop reason: SDUPTHER

## 2025-08-14 RX ORDER — OXYCODONE AND ACETAMINOPHEN 5; 325 MG/1; MG/1
1 TABLET ORAL
Qty: 30 TABLET | Refills: 0 | Status: SHIPPED | OUTPATIENT
Start: 2025-08-14

## 2025-08-14 RX ORDER — TAMSULOSIN HYDROCHLORIDE 0.4 MG/1
1 CAPSULE ORAL DAILY
COMMUNITY
Start: 2025-08-11

## 2025-08-20 ENCOUNTER — OFFICE VISIT (OUTPATIENT)
Dept: UROLOGY | Facility: CLINIC | Age: 65
End: 2025-08-20
Payer: COMMERCIAL

## 2025-08-20 VITALS — HEIGHT: 76 IN | BODY MASS INDEX: 30.69 KG/M2 | WEIGHT: 252 LBS

## 2025-08-20 DIAGNOSIS — N20.0 KIDNEY STONE: Primary | ICD-10-CM

## 2025-08-20 LAB
BILIRUB BLD-MCNC: NEGATIVE MG/DL
CLARITY, POC: CLEAR
COLOR UR: YELLOW
EXPIRATION DATE: NORMAL
GLUCOSE UR STRIP-MCNC: NEGATIVE MG/DL
KETONES UR QL: NEGATIVE
LEUKOCYTE EST, POC: NEGATIVE
Lab: NORMAL
NITRITE UR-MCNC: NEGATIVE MG/ML
PH UR: 6.5 [PH] (ref 5–8)
PROT UR STRIP-MCNC: NEGATIVE MG/DL
RBC # UR STRIP: NEGATIVE /UL
SP GR UR: 1.02 (ref 1–1.03)
UROBILINOGEN UR QL: NORMAL

## 2025-08-20 PROCEDURE — 82365 CALCULUS SPECTROSCOPY: CPT | Performed by: UROLOGY

## 2025-08-20 PROCEDURE — 81003 URINALYSIS AUTO W/O SCOPE: CPT | Performed by: UROLOGY

## 2025-08-20 PROCEDURE — 99203 OFFICE O/P NEW LOW 30 MIN: CPT | Performed by: UROLOGY
